# Patient Record
Sex: FEMALE | Race: WHITE | Employment: FULL TIME | URBAN - METROPOLITAN AREA
[De-identification: names, ages, dates, MRNs, and addresses within clinical notes are randomized per-mention and may not be internally consistent; named-entity substitution may affect disease eponyms.]

---

## 2017-01-16 ENCOUNTER — TELEPHONE (OUTPATIENT)
Dept: FAMILY MEDICINE | Facility: CLINIC | Age: 42
End: 2017-01-16

## 2017-01-16 DIAGNOSIS — E11.65 TYPE 2 DIABETES MELLITUS WITH HYPERGLYCEMIA, WITHOUT LONG-TERM CURRENT USE OF INSULIN (H): Primary | ICD-10-CM

## 2017-01-16 NOTE — TELEPHONE ENCOUNTER
Pharmacy called, requested One touch test strips be replaced with Verio test strips.   Request fulfilled.

## 2017-01-23 ENCOUNTER — MYC MEDICAL ADVICE (OUTPATIENT)
Dept: FAMILY MEDICINE | Facility: CLINIC | Age: 42
End: 2017-01-23

## 2017-01-24 NOTE — TELEPHONE ENCOUNTER
Could you have her make appointment, and bring in her blood sugar log?  This is too complex for a mychart message and needs an appointment.  We'll figure out the hair loss too.    Thanks,  Gabby Morgan MD

## 2017-01-24 NOTE — TELEPHONE ENCOUNTER
Call to patient without answer and left message to call our clinic and schedule an appointment to see Dr Morgan.Bring diabetic log in with her.

## 2017-01-24 NOTE — TELEPHONE ENCOUNTER
Call to patient who states that she did not noticed any change or have any symptoms when her Blood sugar was at 341. Patient stated that she was at the women's march in [place] so the adrenalin was going.Pt reports that her blood sugar has been running over 200 every morning since [place].She reports that when she goes to bed it is 100 and in the morning it is 240.Pt states that she has been under a lot of stress all election related.Pt has been testing 2-3 times a day and needs a new rx for test strips to reflect that. Will forward concerns to provider.

## 2017-02-03 ENCOUNTER — OFFICE VISIT (OUTPATIENT)
Dept: FAMILY MEDICINE | Facility: CLINIC | Age: 42
End: 2017-02-03
Payer: COMMERCIAL

## 2017-02-03 VITALS
OXYGEN SATURATION: 95 % | DIASTOLIC BLOOD PRESSURE: 64 MMHG | RESPIRATION RATE: 16 BRPM | TEMPERATURE: 97.6 F | HEART RATE: 78 BPM | WEIGHT: 179.6 LBS | SYSTOLIC BLOOD PRESSURE: 92 MMHG | HEIGHT: 68 IN | BODY MASS INDEX: 27.22 KG/M2

## 2017-02-03 DIAGNOSIS — L21.9 SEBORRHEIC DERMATITIS: ICD-10-CM

## 2017-02-03 DIAGNOSIS — E11.65 TYPE 2 DIABETES MELLITUS WITH HYPERGLYCEMIA, WITHOUT LONG-TERM CURRENT USE OF INSULIN (H): Primary | ICD-10-CM

## 2017-02-03 LAB — HBA1C MFR BLD: 8.2 % (ref 4.3–6)

## 2017-02-03 PROCEDURE — 83036 HEMOGLOBIN GLYCOSYLATED A1C: CPT | Performed by: FAMILY MEDICINE

## 2017-02-03 PROCEDURE — 84443 ASSAY THYROID STIM HORMONE: CPT | Performed by: FAMILY MEDICINE

## 2017-02-03 PROCEDURE — 80053 COMPREHEN METABOLIC PANEL: CPT | Performed by: FAMILY MEDICINE

## 2017-02-03 PROCEDURE — 99214 OFFICE O/P EST MOD 30 MIN: CPT | Performed by: FAMILY MEDICINE

## 2017-02-03 PROCEDURE — 36415 COLL VENOUS BLD VENIPUNCTURE: CPT | Performed by: FAMILY MEDICINE

## 2017-02-03 RX ORDER — KETOCONAZOLE 20 MG/ML
SHAMPOO TOPICAL
Qty: 120 ML | Refills: 1 | Status: SHIPPED | OUTPATIENT
Start: 2017-02-03

## 2017-02-03 NOTE — PROGRESS NOTES
"  SUBJECTIVE:                                                    Gabby Isaacs is a 42 year old female who presents to clinic today for the following health issues:      Diabetes Follow-up    Patient is checking blood sugars: once daily.  Results are as follows:         am - 300's before meds and eating, before bed and after meds in 100\"s    Diabetic concerns: None and blood sugar frequently over 200     Symptoms of hypoglycemia (low blood sugar): none     Paresthesias (numbness or burning in feet) or sores: No     Date of last diabetic eye exam: 12-5-16       Amount of exercise or physical activity: 2-3 days/week for an average of 45-60 minutes    Problems taking medications regularly: No    Medication side effects: none    Diet: carbohydrate counting      Randomly discovered nighttime with high sugar - in AM.  Always in 300s.  Otherwise when checks at night usually around 140.  STill eating well, really well, but feels like can't do much better.   Would like to test more, or maybe consider adding another med if needed.    Problem list and histories reviewed & adjusted, as indicated.  Additional history: as documented    Recent Labs   Lab Test  11/30/16   0842  08/31/16   0803  08/31/16   0745  05/16/16   1543   A1C  7.0*   --   9.8*   --    LDL  107*  104*   --    --    HDL  65  72   --    --    TRIG  63  59   --    --    CR   --    --   0.70   --    GFRESTIMATED   --    --   >90   --    GFRESTBLACK   --    --   >90   --    POTASSIUM   --    --   3.9   --    TSH   --    --   2.65  1.48        ROS:  Constitutional, HEENT, cardiovascular, pulmonary, gi and gu systems are negative, except as otherwise noted.    OBJECTIVE:                                                    BP 92/64 mmHg  Pulse 78  Temp(Src) 97.6  F (36.4  C) (Tympanic)  Resp 16  Ht 5' 8\" (1.727 m)  Wt 179 lb 9.6 oz (81.466 kg)  BMI 27.31 kg/m2  SpO2 95%  LMP 01/14/2017 (Approximate)  Body mass index is 27.31 kg/(m^2).  GENERAL: healthy, alert " and no distress  NECK: no adenopathy, no asymmetry, masses, or scars and thyroid normal to palpation  RESP: lungs clear to auscultation - no rales, rhonchi or wheezes  CV: regular rate and rhythm, normal S1 S2, no S3 or S4, no murmur, click or rub, no peripheral edema and peripheral pulses strong  ABDOMEN: soft, nontender, no hepatosplenomegaly, no masses and bowel sounds normal  MS: no gross musculoskeletal defects noted, no edema    Diagnostic Test Results:  No results found for this or any previous visit (from the past 24 hour(s)).  Unresulted Labs Ordered in the Past 30 Days of this Admission     Date and Time Order Name Status Description    2/3/2017 1450 COMPREHENSIVE METABOLIC PANEL In process     2/3/2017 1450 HEMOGLOBIN A1C In process     2/3/2017 1450 TSH WITH FREE T4 REFLEX In process              ASSESSMENT/PLAN:                                                          ICD-10-CM    1. Type 2 diabetes mellitus with hyperglycemia, without long-term current use of insulin (H) E11.65 TSH with free T4 reflex     Hemoglobin A1c     Comprehensive metabolic panel (BMP + Alb, Alk Phos, ALT, AST, Total. Bili, TP)     blood glucose monitoring (ONE TOUCH VERIO IQ) test strip   2. Seborrheic dermatitis L21.9 ketoconazole (NIZORAL) 2 % shampoo      For DM:    --Check A1C today.    --Check BS 4 times a day for next 1-2 weeks.    --Mychart results to me.    --Add protein rich bedtime snack.    --Will adjust as needed; may need additional medicine versus just better eating regularly.       For Seb Derm:    --ketoconazole shampoo.    Gabby Morgan MD  [facility]

## 2017-02-03 NOTE — NURSING NOTE
"Chief Complaint   Patient presents with     Diabetes       Initial BP 92/64 mmHg  Pulse 78  Temp(Src) 97.6  F (36.4  C) (Tympanic)  Resp 16  Ht 5' 8\" (1.727 m)  Wt 179 lb 9.6 oz (81.466 kg)  BMI 27.31 kg/m2  SpO2 95%  LMP 01/14/2017 (Approximate) Estimated body mass index is 27.31 kg/(m^2) as calculated from the following:    Height as of this encounter: 5' 8\" (1.727 m).    Weight as of this encounter: 179 lb 9.6 oz (81.466 kg).  BP completed using cuff size: large  .Alesia URBINA      "

## 2017-02-06 LAB
ALBUMIN SERPL-MCNC: 4 G/DL (ref 3.4–5)
ALP SERPL-CCNC: 71 U/L (ref 40–150)
ALT SERPL W P-5'-P-CCNC: 14 U/L (ref 0–50)
ANION GAP SERPL CALCULATED.3IONS-SCNC: 6 MMOL/L (ref 3–14)
AST SERPL W P-5'-P-CCNC: 8 U/L (ref 0–45)
BILIRUB SERPL-MCNC: 0.5 MG/DL (ref 0.2–1.3)
BUN SERPL-MCNC: 17 MG/DL (ref 7–30)
CALCIUM SERPL-MCNC: 9 MG/DL (ref 8.5–10.1)
CHLORIDE SERPL-SCNC: 102 MMOL/L (ref 94–109)
CO2 SERPL-SCNC: 26 MMOL/L (ref 20–32)
CREAT SERPL-MCNC: 0.5 MG/DL (ref 0.52–1.04)
GFR SERPL CREATININE-BSD FRML MDRD: ABNORMAL ML/MIN/1.7M2
GLUCOSE SERPL-MCNC: 315 MG/DL (ref 70–99)
POTASSIUM SERPL-SCNC: 4.4 MMOL/L (ref 3.4–5.3)
PROT SERPL-MCNC: 7.8 G/DL (ref 6.8–8.8)
SODIUM SERPL-SCNC: 134 MMOL/L (ref 133–144)
TSH SERPL DL<=0.005 MIU/L-ACNC: 1.22 MU/L (ref 0.4–4)

## 2017-02-09 ENCOUNTER — MYC MEDICAL ADVICE (OUTPATIENT)
Dept: FAMILY MEDICINE | Facility: CLINIC | Age: 42
End: 2017-02-09

## 2017-02-10 NOTE — TELEPHONE ENCOUNTER
Call to patient who states that she started on her new medication just yesterday and stayed home from work today as she is still not feeling well.Will keep provider updated.

## 2017-09-19 ENCOUNTER — TELEPHONE (OUTPATIENT)
Dept: FAMILY MEDICINE | Facility: CLINIC | Age: 42
End: 2017-09-19

## 2017-09-19 DIAGNOSIS — E10.9 TYPE 1 DIABETES MELLITUS WITHOUT COMPLICATION (H): ICD-10-CM

## 2017-09-19 NOTE — TELEPHONE ENCOUNTER
Called Gabby.  She went to [place] - turns out she has Type 1 Diabetes.  Getting pump.  Will come in for physical some time soon.  Updated chart.  Dr. Gabby Morgan MD/[facility]

## 2018-01-15 ENCOUNTER — TRANSFERRED RECORDS (OUTPATIENT)
Dept: HEALTH INFORMATION MANAGEMENT | Facility: CLINIC | Age: 43
End: 2018-01-15
Payer: COMMERCIAL

## 2018-02-06 ENCOUNTER — TRANSFERRED RECORDS (OUTPATIENT)
Dept: HEALTH INFORMATION MANAGEMENT | Facility: CLINIC | Age: 43
End: 2018-02-06
Payer: COMMERCIAL

## 2018-02-07 ENCOUNTER — APPOINTMENT (OUTPATIENT)
Dept: CT IMAGING | Facility: CLINIC | Age: 43
End: 2018-02-07
Attending: EMERGENCY MEDICINE
Payer: COMMERCIAL

## 2018-02-07 ENCOUNTER — HOSPITAL ENCOUNTER (EMERGENCY)
Facility: CLINIC | Age: 43
Discharge: HOME | End: 2018-02-08
Attending: EMERGENCY MEDICINE | Admitting: EMERGENCY MEDICINE
Payer: COMMERCIAL

## 2018-02-07 DIAGNOSIS — H53.8 BLURRED VISION: ICD-10-CM

## 2018-02-07 DIAGNOSIS — H04.123 DRY EYES: ICD-10-CM

## 2018-02-07 LAB
BASOPHILS # BLD AUTO: 0 10E9/L (ref 0–0.2)
BASOPHILS NFR BLD AUTO: 0.6 %
DIFFERENTIAL METHOD BLD: NORMAL
EOSINOPHIL # BLD AUTO: 0.3 10E9/L (ref 0–0.7)
EOSINOPHIL NFR BLD AUTO: 4.3 %
ERYTHROCYTE [DISTWIDTH] IN BLOOD BY AUTOMATED COUNT: 12.6 % (ref 10–15)
HCT VFR BLD AUTO: 37.8 % (ref 35–47)
HGB BLD-MCNC: 12.8 G/DL (ref 11.7–15.7)
IMM GRANULOCYTES # BLD: 0 10E9/L (ref 0–0.4)
IMM GRANULOCYTES NFR BLD: 0.2 %
LYMPHOCYTES # BLD AUTO: 2.5 10E9/L (ref 0.8–5.3)
LYMPHOCYTES NFR BLD AUTO: 39.4 %
MCH RBC QN AUTO: 28.9 PG (ref 26.5–33)
MCHC RBC AUTO-ENTMCNC: 33.9 G/DL (ref 31.5–36.5)
MCV RBC AUTO: 85 FL (ref 78–100)
MONOCYTES # BLD AUTO: 0.7 10E9/L (ref 0–1.3)
MONOCYTES NFR BLD AUTO: 10.7 %
NEUTROPHILS # BLD AUTO: 2.8 10E9/L (ref 1.6–8.3)
NEUTROPHILS NFR BLD AUTO: 44.8 %
NRBC # BLD AUTO: 0 10*3/UL
NRBC BLD AUTO-RTO: 0 /100
PLATELET # BLD AUTO: 345 10E9/L (ref 150–450)
RBC # BLD AUTO: 4.43 10E12/L (ref 3.8–5.2)
WBC # BLD AUTO: 6.2 10E9/L (ref 4–11)

## 2018-02-07 PROCEDURE — 96360 HYDRATION IV INFUSION INIT: CPT

## 2018-02-07 PROCEDURE — 85025 COMPLETE CBC W/AUTO DIFF WBC: CPT | Performed by: EMERGENCY MEDICINE

## 2018-02-07 PROCEDURE — 25000128 H RX IP 250 OP 636: Performed by: EMERGENCY MEDICINE

## 2018-02-07 PROCEDURE — 96361 HYDRATE IV INFUSION ADD-ON: CPT

## 2018-02-07 PROCEDURE — 70487 CT MAXILLOFACIAL W/DYE: CPT

## 2018-02-07 PROCEDURE — 99285 EMERGENCY DEPT VISIT HI MDM: CPT | Mod: 25

## 2018-02-07 PROCEDURE — 80048 BASIC METABOLIC PNL TOTAL CA: CPT | Performed by: EMERGENCY MEDICINE

## 2018-02-07 RX ORDER — IOPAMIDOL 755 MG/ML
80 INJECTION, SOLUTION INTRAVASCULAR ONCE
Status: COMPLETED | OUTPATIENT
Start: 2018-02-07 | End: 2018-02-08

## 2018-02-07 RX ADMIN — SODIUM CHLORIDE 1000 ML: 9 INJECTION, SOLUTION INTRAVENOUS at 23:50

## 2018-02-07 ASSESSMENT — VISUAL ACUITY
OS: 20/25
OD: 20/25

## 2018-02-07 ASSESSMENT — ENCOUNTER SYMPTOMS
EYE ITCHING: 0
PHOTOPHOBIA: 0
EYE PAIN: 0

## 2018-02-07 NOTE — ED AVS SNAPSHOT
Emergency Department    [address] [place] [address]    Phone:  [phone]    Fax:  [phone]                                       Gabby Isaacs   MRN: [id number]    Department:   Emergency Department   Date of Visit:  2/7/2018           After Visit Summary Signature Page     I have received my discharge instructions, and my questions have been answered. I have discussed any challenges I see with this plan with the nurse or doctor.    ..........................................................................................................................................  Patient/Patient Representative Signature      ..........................................................................................................................................  Patient Representative Print Name and Relationship to Patient    ..................................................               ................................................  Date                                            Time    ..........................................................................................................................................  Reviewed by Signature/Title    ...................................................              ..............................................  Date                                                            Time

## 2018-02-07 NOTE — ED AVS SNAPSHOT
Emergency Department    [address] [facility] [address]    Phone:  [phone]    Fax:  [phone]                                       Gabby Isaacs   MRN: [id number]    Department:   Emergency Department   Date of Visit:  2/7/2018           Patient Information     Date Of Birth          1975        Your diagnoses for this visit were:     Dry eyes     Blurred vision        You were seen by Norm Flores MD.      Follow-up Information     Follow up with Call your ENT doctor . Call in 1 day.    Why:  For repeat evaluation and symptom check        Follow up with  Emergency Department.    Specialty:  EMERGENCY MEDICINE    Why:  If symptoms worsen    Contact information:    [address] [facility] [address] [phone]        Discharge Instructions         What Are Dry Eyes?    Do your eyes ever sting, burn, or feel scratchy? To be comfortable, your eyes need to be bathed, or lubricated, with tears. Normally, there is always a film of tears on the surface of your eyes. But if your eyes don t produce enough tears, the surface gets irritated. This is known as dry eyes.  Not enough lubricating tears  When you cry, or get something in your eye, or have an infection, your eyes make reflex tears. Each time you blink, another kind of tears, called lubricating tears, spread over the surface of your eyes. These tears keep the eyes moist and comfortable. You aren t aware of these tears because they stay on the surface of your eyes.  Without lubricating tears, your eyes get dry. Then they burn or sting and feel scratchy. They may also water. But this doesn t relieve the dryness. That's because the eyes water with reflex tears, not lubricating tears.  What causes dry eyes?    Aging    Heaters and air conditioners    Wind, smoke, or dry weather    Allergies such as hay fever    Medicines    Eyelid problems, injuries to the eye, or diseases like rheumatoid  arthritis  How lubricating tears flow  Lubricating tears flow from glands in your upper eyelid over the surface of your eye. From your eye, the tears drain into puncta, which connect to drainage canals that lead to your nose.  Date Last Reviewed: 6/6/2015 2000-2017 [employer]. [address] [place] [address]. All rights reserved. This information is not intended as a substitute for professional medical care. Always follow your healthcare professional's instructions.          How the Eye Works    Sharp vision depends on many factors. The parts of the eye work together to refract, or bend, and focus light rays. For normal vision, light must focus onto the retina.   Cornea  Light enters the eye through this clear, dome-shaped tissue. The cornea also bends light rays to help focus them. Problems with the cornea's shape can affect vision.  Pupil  This circular window in the center of the iris opens and closes to let the right amount of light into the eye.  Iris  This is the colored part of the eye. It contains muscles that dilate or open, and constrict or close, the pupil.  Lens  This disk of clear tissue behind the pupil changes shape, or accommodates, to help focus light.  Retina  This thin layer of light-sensitive tissue lines the inside of the eye. The retina sends signals to the optic nerve.  Optic nerve  This nerve carries signals from the retina to the brain. The brain then interprets these signals to make images. These images are what you see.  Date Last Reviewed: 9/9/2015 2000-2017 [employer]. [address] [place] [address]. All rights reserved. This information is not intended as a substitute for professional medical care. Always follow your healthcare professional's instructions.          Discharge References/Attachments     VISION, BLURRED (ENGLISH)      24 Hour Appointment Hotline       To make an appointment at any [facility], call [phone]  ([phone]). If you don't have a family doctor or clinic, we will help you find one. [facility] are conveniently located to serve the needs of you and your family.             Review of your medicines      Our records show that you are taking the medicines listed below. If these are incorrect, please call your family doctor or clinic.        Dose / Directions Last dose taken    ACE NOT PRESCRIBED (INTENTIONAL)   Quantity:  0 each        ACE Inhibitor not prescribed due to Patient of childbearing potential   Refills:  0        ACE/ARB/ARNI NOT PRESCRIBED (INTENTIONAL)        Please choose reason not prescribed, below   Refills:  0        albuterol 108 (90 BASE) MCG/ACT Inhaler   Commonly known as:  PROAIR HFA/PROVENTIL HFA/VENTOLIN HFA   Dose:  1-2 puff   Quantity:  1 Inhaler        Inhale 1-2 puffs into the lungs every 4 hours as needed for shortness of breath / dyspnea or wheezing   Refills:  11        beclomethasone 40 MCG/ACT Inhaler   Commonly known as:  QVAR   Dose:  2 puff   Quantity:  3 Inhaler        Inhale 2 puffs into the lungs 2 times daily   Refills:  1        blood glucose monitoring lancets   Quantity:  1 Box        Use to test blood sugars one times daily. Please fill One Touch lancets to fit meter.   Refills:  1        blood glucose monitoring meter device kit   Commonly known as:  no brand specified   Quantity:  1 kit        Use to test blood sugar one times daily. Please dispense One Touch meter.   Refills:  0        blood glucose monitoring test strip   Commonly known as:  ONETOUCH VERIO IQ   Quantity:  100 strip        Use to test blood sugars four times daily. Please dispense Verio Test Strips.   Refills:  11        cholecalciferol 1000 UNIT tablet   Commonly known as:  vitamin D3   Dose:  2000 Units   Quantity:  180 tablet        Take 2 tablets (2,000 Units) by mouth daily   Refills:  3        EPIPEN 2-PAXTON 0.3 MG/0.3ML injection 2-pack   Generic drug:  EPINEPHrine        Refills:  0         ketoconazole 2 % shampoo   Commonly known as:  NIZORAL   Quantity:  120 mL        Apply to the affected area and wash off after 5 minutes.   Refills:  1        levothyroxine 175 MCG tablet   Commonly known as:  SYNTHROID/LEVOTHROID   Quantity:  90 tablet        TAKE 1 TABLET (175 MCG) BY MOUTH DAILY   Refills:  2        STATIN NOT PRESCRIBED (INTENTIONAL)   Quantity:  0 each        Statin not prescribed intentionally due to Woman of childbearing age not actively taking birth control   Refills:  0                Procedures and tests performed during your visit     Basic metabolic panel    CBC with platelets differential    CT Maxillofacial w Contrast      Orders Needing Specimen Collection     None      Pending Results     Date and Time Order Name Status Description    2/7/2018 2331 CT Maxillofacial w Contrast Preliminary             Pending Culture Results     No orders found for last 3 day(s).            Pending Results Instructions     If you had any lab results that were not finalized at the time of your Discharge, you can call the ED Lab Result RN at [phone]. You will be contacted by this team for any positive Lab results or changes in treatment. The nurses are available 7 days a week from 10A to 6:30P.  You can leave a message 24 hours per day and they will return your call.        Test Results From Your Hospital Stay        2/7/2018 11:46 PM      Component Results     Component Value Ref Range & Units Status    WBC 6.2 4.0 - 11.0 10e9/L Final    RBC Count 4.43 3.8 - 5.2 10e12/L Final    Hemoglobin 12.8 11.7 - 15.7 g/dL Final    Hematocrit 37.8 35.0 - 47.0 % Final    MCV 85 78 - 100 fl Final    MCH 28.9 26.5 - 33.0 pg Final    MCHC 33.9 31.5 - 36.5 g/dL Final    RDW 12.6 10.0 - 15.0 % Final    Platelet Count 345 150 - 450 10e9/L Final    Diff Method Automated Method  Final    % Neutrophils 44.8 % Final    % Lymphocytes 39.4 % Final    % Monocytes 10.7 % Final    % Eosinophils 4.3 % Final    %  Basophils 0.6 % Final    % Immature Granulocytes 0.2 % Final    Nucleated RBCs 0 0 /100 Final    Absolute Neutrophil 2.8 1.6 - 8.3 10e9/L Final    Absolute Lymphocytes 2.5 0.8 - 5.3 10e9/L Final    Absolute Monocytes 0.7 0.0 - 1.3 10e9/L Final    Absolute Eosinophils 0.3 0.0 - 0.7 10e9/L Final    Absolute Basophils 0.0 0.0 - 0.2 10e9/L Final    Abs Immature Granulocytes 0.0 0 - 0.4 10e9/L Final    Absolute Nucleated RBC 0.0  Final         2/8/2018 12:02 AM      Component Results     Component Value Ref Range & Units Status    Sodium 136 133 - 144 mmol/L Final    Potassium 3.8 3.4 - 5.3 mmol/L Final    Chloride 104 94 - 109 mmol/L Final    Carbon Dioxide 23 20 - 32 mmol/L Final    Anion Gap 9 3 - 14 mmol/L Final    Glucose 127 (H) 70 - 99 mg/dL Final    Urea Nitrogen 14 7 - 30 mg/dL Final    Creatinine 0.61 0.52 - 1.04 mg/dL Final    GFR Estimate >90 >60 mL/min/1.7m2 Final    Non  GFR Calc    GFR Estimate If Black >90 >60 mL/min/1.7m2 Final    African American GFR Calc    Calcium 8.2 (L) 8.5 - 10.1 mg/dL Final         2/8/2018 12:43 AM      Narrative     CT MAXILLOFACIAL W CONTRAST  2/8/2018 12:33 AM      HISTORY: Recent sinus surgery. New bilateral eye pain, right greater  than left.    TECHNIQUE: Multiplanar imaging of the face with intravenous contrast.  Radiation dose for this scan was reduced using automated exposure  control, adjustment of the mA and/or kV according to patient size, or  iterative reconstruction technique. 80 mL Isovue-370.     COMPARISON: None.    FINDINGS: There are postoperative changes in the medial walls of the  maxillary sinuses bilaterally. There is mucosal thickening in all of  the paranasal sinuses. Fluid levels in the left maxillary sinus and  sphenoid sinus. The orbits appear normal bilaterally. Lamina papyracea  appears intact bilaterally. No acute bone fractures.        Impression     IMPRESSION:  1. Normal appearance of the orbits.  2. Pansinusitis.                 Clinical Quality Measure: Blood Pressure Screening     Your blood pressure was checked while you were in the emergency department today. The last reading we obtained was  BP: 104/53 . Please read the guidelines below about what these numbers mean and what you should do about them.  If your systolic blood pressure (the top number) is less than 120 and your diastolic blood pressure (the bottom number) is less than 80, then your blood pressure is normal. There is nothing more that you need to do about it.  If your systolic blood pressure (the top number) is 120-139 or your diastolic blood pressure (the bottom number) is 80-89, your blood pressure may be higher than it should be. You should have your blood pressure rechecked within a year by a primary care provider.  If your systolic blood pressure (the top number) is 140 or greater or your diastolic blood pressure (the bottom number) is 90 or greater, you may have high blood pressure. High blood pressure is treatable, but if left untreated over time it can put you at risk for heart attack, stroke, or kidney failure. You should have your blood pressure rechecked by a primary care provider within the next 4 weeks.  If your provider in the emergency department today gave you specific instructions to follow-up with your doctor or provider even sooner than that, you should follow that instruction and not wait for up to 4 weeks for your follow-up visit.        Thank you for choosing [facility]       Thank you for choosing [facility] for your care. Our goal is always to provide you with excellent care. Hearing back from our patients is one way we can continue to improve our services. Please take a few minutes to complete the written survey that you may receive in the mail after you visit with us. Thank you!        [facility] Information     [facility] gives you secure access to your electronic health record. If you see a primary care provider, you can also send messages to your care  team and make appointments. If you have questions, please call your primary care clinic.  If you do not have a primary care provider, please call [phone] and they will assist you.        Care EveryWhere ID     This is your Care EveryWhere ID. This could be used by other organizations to access your [facility] medical records  [id number]        Equal Access to Services     SUMAN QUINTANA : Zahraa Ramirez, caren torre, rolanda madrigal. So [facility] [phone].    ATENCIÓN: Si habla español, tiene a ladd disposición servicios gratuitos de asistencia lingüística. Llame al [phone].    We comply with applicable federal civil rights laws and [place] laws. We do not discriminate on the basis of race, color, national origin, age, disability, sex, sexual orientation, or gender identity.            After Visit Summary       This is your record. Keep this with you and show to your community pharmacist(s) and doctor(s) at your next visit.

## 2018-02-08 VITALS
BODY MASS INDEX: 27.28 KG/M2 | SYSTOLIC BLOOD PRESSURE: 101 MMHG | DIASTOLIC BLOOD PRESSURE: 61 MMHG | RESPIRATION RATE: 20 BRPM | HEIGHT: 68 IN | WEIGHT: 180 LBS | TEMPERATURE: 98.6 F | HEART RATE: 77 BPM | OXYGEN SATURATION: 97 %

## 2018-02-08 LAB
ANION GAP SERPL CALCULATED.3IONS-SCNC: 9 MMOL/L (ref 3–14)
BUN SERPL-MCNC: 14 MG/DL (ref 7–30)
CALCIUM SERPL-MCNC: 8.2 MG/DL (ref 8.5–10.1)
CHLORIDE SERPL-SCNC: 104 MMOL/L (ref 94–109)
CO2 SERPL-SCNC: 23 MMOL/L (ref 20–32)
CREAT SERPL-MCNC: 0.61 MG/DL (ref 0.52–1.04)
GFR SERPL CREATININE-BSD FRML MDRD: >90 ML/MIN/1.7M2
GLUCOSE SERPL-MCNC: 127 MG/DL (ref 70–99)
POTASSIUM SERPL-SCNC: 3.8 MMOL/L (ref 3.4–5.3)
SODIUM SERPL-SCNC: 136 MMOL/L (ref 133–144)

## 2018-02-08 PROCEDURE — 25000125 ZZHC RX 250: Performed by: EMERGENCY MEDICINE

## 2018-02-08 PROCEDURE — 25000128 H RX IP 250 OP 636: Performed by: EMERGENCY MEDICINE

## 2018-02-08 RX ADMIN — SODIUM CHLORIDE 60 ML: 9 INJECTION, SOLUTION INTRAVENOUS at 00:28

## 2018-02-08 RX ADMIN — IOPAMIDOL 80 ML: 755 INJECTION, SOLUTION INTRAVENOUS at 00:27

## 2018-02-08 NOTE — DISCHARGE INSTRUCTIONS
What Are Dry Eyes?    Do your eyes ever sting, burn, or feel scratchy? To be comfortable, your eyes need to be bathed, or lubricated, with tears. Normally, there is always a film of tears on the surface of your eyes. But if your eyes don t produce enough tears, the surface gets irritated. This is known as dry eyes.  Not enough lubricating tears  When you cry, or get something in your eye, or have an infection, your eyes make reflex tears. Each time you blink, another kind of tears, called lubricating tears, spread over the surface of your eyes. These tears keep the eyes moist and comfortable. You aren t aware of these tears because they stay on the surface of your eyes.  Without lubricating tears, your eyes get dry. Then they burn or sting and feel scratchy. They may also water. But this doesn t relieve the dryness. That's because the eyes water with reflex tears, not lubricating tears.  What causes dry eyes?    Aging    Heaters and air conditioners    Wind, smoke, or dry weather    Allergies such as hay fever    Medicines    Eyelid problems, injuries to the eye, or diseases like rheumatoid arthritis  How lubricating tears flow  Lubricating tears flow from glands in your upper eyelid over the surface of your eye. From your eye, the tears drain into puncta, which connect to drainage canals that lead to your nose.  Date Last Reviewed: 6/6/2015 2000-2017 [employer]. [address] [place], [place] [id number]. All rights reserved. This information is not intended as a substitute for professional medical care. Always follow your healthcare professional's instructions.          How the Eye Works    Sharp vision depends on many factors. The parts of the eye work together to refract, or bend, and focus light rays. For normal vision, light must focus onto the retina.   Cornea  Light enters the eye through this clear, dome-shaped tissue. The cornea also bends light rays to help focus them. Problems with the  cornea's shape can affect vision.  Pupil  This circular window in the center of the iris opens and closes to let the right amount of light into the eye.  Iris  This is the colored part of the eye. It contains muscles that dilate or open, and constrict or close, the pupil.  Lens  This disk of clear tissue behind the pupil changes shape, or accommodates, to help focus light.  Retina  This thin layer of light-sensitive tissue lines the inside of the eye. The retina sends signals to the optic nerve.  Optic nerve  This nerve carries signals from the retina to the brain. The brain then interprets these signals to make images. These images are what you see.  Date Last Reviewed: 9/9/2015 2000-2017 [employer]. [address] [place] [address], [place] [address]. All rights reserved. This information is not intended as a substitute for professional medical care. Always follow your healthcare professional's instructions.

## 2018-02-08 NOTE — ED PROVIDER NOTES
History     Chief Complaint:  Eye Problem     HPI   Gabby Isaacs is a 43-year-old female with a history of diabetes and chronic dry eye, one day status post sinus surgery who presents to the emergency department today for evaluation of blurred vision. The patient reports that she called her ENT doctor who performed the surgery and was told she needed to be urgently evaluated by an ophthalmologist to evaluate for bleeding or swelling. She states that the blurred vision was first noticed at 0200 this morning when she woke up to take more pain medication. She remarks that it seems that her up close vision is impaired but she can see normally from a distance. She initially did not think much of this due to her history of chronic dry eye because she has similar symptoms, especially upon waking in the morning although these symptoms usually fade. The patient denies any loss of her visual field, seeing flashing lights or floaters, and denies any photophobia. She reports that she normally has 20/20 vision in both eyes.     Allergies:  No known drug allergies.     Medications:    Levothyroxine  Ketoconazole  Statin  Cholecalciferol  Beclomethasone  Albuterol inhaler   Epipen    Past Medical History:    History reviewed. No pertinent past medical history.    Past Surgical History:    Appendectomy  Deviated septum  Thyroidectomy    Family History:    Diabetes    Mother   Hypertension   Mother   Rhinitis   Mother   Arthritis   Mother   Depression   Mother   Hyperlipidemia   Mother   Osteoporosis   Mother   Mental Illness   Mother   Thyroid Disease  Mother   Mental Illness   Father   Cirrhosis   Father   Rhinitis   Father   Depression   Father   Rhinitis   Sister   Depression   Sister   Cirrhosis   Brother   Rhinitis   Brother   Mental Illness   Sister   Mental Illness   Brother    Social History:  The patient was accompanied to the ED by her mother.  Smoking Status: Former Smoker -- Cigarettes  Smokeless Tobacco: Never  "Used  Alcohol Use: Positive  Marital Status: Single      Review of Systems   Eyes: Positive for visual disturbance. Negative for photophobia, pain and itching.   All other systems reviewed and are negative.  Otherwise as stated in the HPI.     Physical Exam     Patient Vitals for the past 24 hrs:   BP Temp Temp src Pulse Resp SpO2 Height Weight   02/08/18 0145 101/61 - - - - - - -   02/08/18 0130 104/63 - - - - - - -   02/08/18 0115 104/53 - - - - 97 % - -   02/08/18 0100 107/61 - - - - 97 % - -   02/08/18 0051 - - - - - 98 % - -   02/08/18 0049 97/63 - - - - - - -   02/07/18 2202 116/64 98.6  F (37  C) Oral 77 20 100 % 1.727 m (5' 8\") 81.6 kg (180 lb)     Visual Acuity-Left: 20/25  Visual Acuity-Right: 20/25    Physical Exam  Vitals: reviewed by me  General: Pt seen on [facility], cooperative, and alert to conversation  Eyes: Tracking well, clear conjunctiva BL, extraocular movements are intact, no proptosis, pupils are round and reactive to both sides with no APD, visual fields are intact ×4, patient can read small font across the room with each eye.  ENT: MMM, midline trachea.  Oropharynx is unremarkable, no lesions noted.  No external evidence of trauma to nose.  Bilateral nares with no evidence of infection, no drainage noted.  No tenderness to palpation to maxillary or frontal sinuses.  Lungs:  No tachypnea, no accessory muscle use. No respiratory distress.   CV: Rate as above, regular rhythm.    Abd: Soft, non tender, no guarding, no rebound. Non distended  MSK: no peripheral edema or joint effusion.  No evidence of trauma  Skin: No rash, normal turgor and temperature  Neuro: Clear speech and no facial droop.  Psych: Not RIS, no e/o AH/VH    Emergency Department Course   Imaging:  CT Maxillofacial w Contrast  1. Normal appearance of the orbits.  2. Pansinusitis.    Radiographic findings were communicated with the patient who voiced understanding of the findings.    Laboratory:  Blood:  CBC:  " WBC 6.2, HGB 12.8, , otherwise WNL   BMP: Glc 127, Calcium 8.2, otherwise WNL (Creatinine 0.61)     Interventions:  (2350) Normal Saline, 1 liter, IV bolus      Emergency Department Course:  Nursing notes and vitals reviewed.    (4922) I entered the room with my scribe, obtained the history, and performed an exam of the patient as documented above.    A peripheral IV was established. Blood was drawn from the patient. This was sent for laboratory testing, findings above.      The patient was sent for a CT scan of the max-face while in the emergency department, findings above.      (0125) I went to update the patient on the findings. Answered questions prior to discharge.     Findings and plan explained to the patient. Patient discharged home with instructions regarding supportive care, medications, and reasons to return. The importance of close follow-up was reviewed.     Impression & Plan      Medical Decision Making:  Gabby Isaacs is a 43-year-old female who presents to the emergency room with bilateral eye dryness after an ENT procedure yesterday.  The patient's main concern, and the reason she was sent by the ENT physician, was to evaluate for any cause of trauma or infection.  The patient has a normal ENT exam apart from some mild swelling noted to her turbinates, and has no pain with extraocular movements making retro-orbital abscess very unlikely.  CT scan of the face shows no evidence of trauma or hematoma.  The patient has normal visual acuity here, no evidence of glaucoma, no evidence of visual field cuts. It is also slightly reassuring that the symptoms are bilateral and not unilateral.  The patient does state that this is similar to her chronic dry eyes, and her symptoms are better with artificial tears.  I do favor the diagnosis of slightly worse dry eyes after an ENT procedure as no other diagnosis has been found.  Very clear return to ED precautions were given, the patient states that she can  call her ENT doctor tomorrow.    Diagnosis:    ICD-10-CM   1. Dry eyes H04.123   2. Blurred vision H53.8     Disposition:   Discharge        [facility] EMERGENCY DEPARTMENT        Scribe disclosure:  I, Jose Lew, am serving as a scribe on 2/7/2018 at 11:22 PM to personally document services performed by Dr. Flores based on my observations and the provider's statements to me.                     Norm Flores MD  02/08/18 0452

## 2018-03-30 ENCOUNTER — TELEPHONE (OUTPATIENT)
Dept: FAMILY MEDICINE | Facility: CLINIC | Age: 43
End: 2018-03-30

## 2018-04-11 ENCOUNTER — HOSPITAL ENCOUNTER (OUTPATIENT)
Dept: MAMMOGRAPHY | Facility: CLINIC | Age: 43
Discharge: HOME | End: 2018-04-11
Admitting: RADIOLOGY
Payer: COMMERCIAL

## 2018-04-11 DIAGNOSIS — Z12.31 VISIT FOR SCREENING MAMMOGRAM: ICD-10-CM

## 2018-04-11 PROCEDURE — 77063 BREAST TOMOSYNTHESIS BI: CPT

## 2018-04-25 ENCOUNTER — TRANSFERRED RECORDS (OUTPATIENT)
Dept: HEALTH INFORMATION MANAGEMENT | Facility: CLINIC | Age: 43
End: 2018-04-25

## 2018-04-25 LAB
AST SERPL-CCNC: 13 U/L (ref 8–43)
CREAT SERPL-MCNC: 0.6 MG/DL (ref 0.6–1.1)
GFR SERPL CREATININE-BSD FRML MDRD: >60 ML/MIN/BSA
GLUCOSE SERPL-MCNC: 151 MG/DL (ref 70–100)
HBA1C MFR BLD: 7.1 % (ref 4–5.6)
POTASSIUM SERPL-SCNC: 4.3 MMOL/L (ref 3.6–5.2)
TSH SERPL-ACNC: 0.2 MIU/L (ref 0.3–4.2)

## 2018-06-01 ENCOUNTER — OFFICE VISIT (OUTPATIENT)
Dept: FAMILY MEDICINE | Facility: CLINIC | Age: 43
End: 2018-06-01
Payer: COMMERCIAL

## 2018-06-01 VITALS
BODY MASS INDEX: 26.61 KG/M2 | OXYGEN SATURATION: 99 % | HEART RATE: 78 BPM | WEIGHT: 175 LBS | TEMPERATURE: 98.4 F | SYSTOLIC BLOOD PRESSURE: 104 MMHG | DIASTOLIC BLOOD PRESSURE: 62 MMHG

## 2018-06-01 DIAGNOSIS — N94.6 DYSMENORRHEA: Primary | ICD-10-CM

## 2018-06-01 DIAGNOSIS — C73 PAPILLARY CARCINOMA OF THYROID (H): ICD-10-CM

## 2018-06-01 DIAGNOSIS — J45.30 MILD PERSISTENT ASTHMA WITHOUT COMPLICATION: ICD-10-CM

## 2018-06-01 DIAGNOSIS — E10.9 TYPE 1 DIABETES MELLITUS WITHOUT COMPLICATION (H): ICD-10-CM

## 2018-06-01 DIAGNOSIS — Z13.89 SCREENING FOR DIABETIC PERIPHERAL NEUROPATHY: ICD-10-CM

## 2018-06-01 PROCEDURE — 99214 OFFICE O/P EST MOD 30 MIN: CPT | Performed by: FAMILY MEDICINE

## 2018-06-01 RX ORDER — EPINEPHRINE 0.3 MG/.3ML
0.3 INJECTION SUBCUTANEOUS PRN
Qty: 0.6 ML | Refills: 1 | Status: SHIPPED | OUTPATIENT
Start: 2018-06-01

## 2018-06-01 RX ORDER — NORETHINDRONE ACETATE AND ETHINYL ESTRADIOL 1.5-30(21)
1 KIT ORAL DAILY
Qty: 90 TABLET | Refills: 3 | Status: SHIPPED | OUTPATIENT
Start: 2018-06-01 | End: 2019-04-04

## 2018-06-01 NOTE — PROGRESS NOTES
SUBJECTIVE:   Gabby Isaacs is a 43 year old female who presents to clinic today for the following health issues:      Medication Followup of junel    Taking Medication as prescribed: NO    Side Effects:  None    Medication Helping Symptoms:  NO-pt would like to restart     43 year old with type 1 diabetes and history of papillary carcinoma of thyroid both managed by [facility] here today for:    Heavy, heavy periods.  Can she go back on Junel?  Worked really well in past.  No family history or personal hx of blood clots and non-smoker.    Could she have refill of Epipen?  Multiple allergy.    Would like to discuss her diagnosis of type 2 DM; how it all happened as she got really sick before she was dx with type 1 at [place].    Asthma well controlled on qvar - doing well.  Could we fill?    ACT Total Scores 6/1/2018   ACT TOTAL SCORE (Goal Greater than or Equal to 20) 23   In the past 12 months, how many times did you visit the emergency room for your asthma without being admitted to the hospital? 1   In the past 12 months, how many times were you hospitalized overnight because of your asthma? 0         Problem list and histories reviewed & adjusted, as indicated.  Additional history: as documented    =    Reviewed and updated as needed this visit by clinical staff  Tobacco  Allergies  Meds  Problems  Med Hx  Surg Hx  Fam Hx  Soc Hx        Reviewed and updated as needed this visit by Provider  Allergies  Meds  Problems         ROS:  Constitutional, HEENT, cardiovascular, pulmonary, gi and gu systems are negative, except as otherwise noted.    OBJECTIVE:     /62 (Cuff Size: Adult Large)  Pulse 78  Temp 98.4  F (36.9  C) (Tympanic)  Wt 175 lb (79.4 kg)  SpO2 99%  BMI 26.61 kg/m2  Body mass index is 26.61 kg/(m^2).  GENERAL: healthy, alert and no distress  PSYCH: mentation appears normal, affect normal/bright      ASSESSMENT/PLAN:       Gabby was seen today for recheck medication.    Diagnoses and  all orders for this visit:    Dysmenorrhea  Comments:  >10 years on OCP for cramps; in same sex relationships so no need for birth control; consider stopping OCP x 3 months and see how periods are; restart if needed  Orders:  -     norethindrone-ethinyl estradiol-iron (JUNEL FE 1.5/30) 1.5-30 MG-MCG per tablet; Take 1 tablet by mouth daily    Type 1 diabetes mellitus without complication (H)    Mild persistent asthma without complication  -     EPINEPHrine (EPIPEN 2-PAXTON) 0.3 MG/0.3ML injection 2-pack; Inject 0.3 mLs (0.3 mg) into the muscle as needed for anaphylaxis  -     beclomethasone (QVAR) 40 MCG/ACT Inhaler; Inhale 2 puffs into the lungs 2 times daily  -     glucagon (GLUCAGON EMERGENCY) 1 MG kit; Inject 1 mg into the muscle once for 1 dose    Papillary carcinoma of thyroid (H)    Screening for diabetic peripheral neuropathy    Other orders  -     Cancel: HIV Screening  -     Cancel: FOOT EXAM  NO CHARGE [04257.114]  -     Cancel: HEMOGLOBIN A1C  -     Cancel: Lipid panel reflex to direct LDL Fasting  -     Cancel: Albumin Random Urine Quantitative with Creat Ratio  -     Cancel: TSH WITH FREE T4 REFLEX      For DM:  --Discussed dx and why type 1 wasn't tested for initially (fam history of type 2, overweight, responded to metformin and diet changes).  --Apologized to patient!  Glad she is doing so well now.  --Managed by [facility].  Sent results to abstracting; well controlled.    For Dysmenorrhea:  --Junel has worked well in past; patient would like to restart.  --No personal or fam history of liver disease or blood clots.  --Discussed risks of this with oral contraceptive pills and other options for dysmenorrhea (IUD, ablation).  --Patient would prefer oral contraceptive pills; refilled.      For asthma/allergies:  --Well controlled, refilled qvar and epipen.    Discussed with patient, all questions answered, in agreement with this plan, will return or seek further care if not improving or worsening.    Gabby Rain  MD Eddie  [facility]

## 2018-06-01 NOTE — Clinical Note
Can you abstract diabetes labs from [facility]?  In Care Everywhere and chart. Thanks, Dr. Gabby Morgan MD/[facility]

## 2018-06-01 NOTE — LETTER
My Asthma Action Plan  Name: Gabby Isaacs   YOB: 1975  Date: 6/1/2018   My doctor: Gabby Morgan MD   My clinic: [facility]        My Control Medicine: { :852368}  My Rescue Medicine: { :221565}  {AAP include Oral Steroid:075787} My Asthma Severity: { :453287}  Avoid your asthma triggers: { :470854}        {Is patient a child or adult?:847099}       GREEN ZONE   Good Control    I feel good    No cough or wheeze    Can work, sleep and play without asthma symptoms       Take your asthma control medicine every day.     1. If exercise triggers your asthma, take your rescue medication    15 minutes before exercise or sports, and    During exercise if you have asthma symptoms  2. Spacer to use with inhaler: If you have a spacer, make sure to use it with your inhaler             YELLOW ZONE Getting Worse  I have ANY of these:    I do not feel good    Cough or wheeze    Chest feels tight    Wake up at night   1. Keep taking your Green Zone medications  2. Start taking your rescue medicine:    every 20 minutes for up to 1 hour. Then every 4 hours for 24-48 hours.  3. If you stay in the Yellow Zone for more than 12-24 hours, contact your doctor.  4. If you do not return to the Green Zone in 12-24 hours or you get worse, start taking your oral steroid medicine if prescribed by your provider.           RED ZONE Medical Alert - Get Help  I have ANY of these:    I feel awful    Medicine is not helping    Breathing getting harder    Trouble walking or talking    Nose opens wide to breathe       1. Take your rescue medicine NOW  2. If your provider has prescribed an oral steroid medicine, start taking it NOW  3. Call your doctor NOW  4. If you are still in the Red Zone after 20 minutes and you have not reached your doctor:    Take your rescue medicine again and    Call 911 or go to the emergency room right away    See your regular doctor within 2 weeks of an Emergency Room or  Urgent Care visit for follow-up treatment.          Annual Reminders:  Meet with Asthma Educator,  Flu Shot in the Fall, consider Pneumonia Vaccination for patients with asthma (aged 19 and older).    Pharmacy: [facility] [address] [place] [address] [place]                      Asthma Triggers  How To Control Things That Make Your Asthma Worse    Triggers are things that make your asthma worse.  Look at the list below to help you find your triggers and what you can do about them.  You can help prevent asthma flare-ups by staying away from your triggers.      Trigger                                                          What you can do   Cigarette Smoke  Tobacco smoke can make asthma worse. Do not allow smoking in your home, car or around you.  Be sure no one smokes at a child s day care or school.  If you smoke, ask your health care provider for ways to help you quit.  Ask family members to quit too.  Ask your health care provider for a referral to Quit Plan to help you quit smoking, or call 5-228-640-PLAN.     Colds, Flu, Bronchitis  These are common triggers of asthma. Wash your hands often.  Don t touch your eyes, nose or mouth.  Get a flu shot every year.     Dust Mites  These are tiny bugs that live in cloth or carpet. They are too small to see. Wash sheets and blankets in hot water every week.   Encase pillows and mattress in dust mite proof covers.  Avoid having carpet if you can. If you have carpet, vacuum weekly.   Use a dust mask and HEPA vacuum.   Pollen and Outdoor Mold  Some people are allergic to trees, grass, or weed pollen, or molds. Try to keep your windows closed.  Limit time out doors when pollen count is high.   Ask you health care provider about taking medicine during allergy season.     Animal Dander  Some people are allergic to skin flakes, urine or saliva from pets with fur or feathers. Keep pets with fur or feathers out of your home.    If you can t keep the pet outdoors, then  keep the pet out of your bedroom.  Keep the bedroom door closed.  Keep pets off cloth furniture and away from stuffed toys.     Mice, Rats, and Cockroaches  Some people are allergic to the waste from these pests.   Cover food and garbage.  Clean up spills and food crumbs.  Store grease in the refrigerator.   Keep food out of the bedroom.   Indoor Mold  This can be a trigger if your home has high moisture. Fix leaking faucets, pipes, or other sources of water.   Clean moldy surfaces.  Dehumidify basement if it is damp and smelly.   Smoke, Strong Odors, and Sprays  These can reduce air quality. Stay away from strong odors and sprays, such as perfume, powder, hair spray, paints, smoke incense, paint, cleaning products, candles and new carpet.   Exercise or Sports  Some people with asthma have this trigger. Be active!  Ask your doctor about taking medicine before sports or exercise to prevent symptoms.    Warm up for 5-10 minutes before and after sports or exercise.     Other Triggers of Asthma  Cold air:  Cover your nose and mouth with a scarf.  Sometimes laughing or crying can be a trigger.  Some medicines and food can trigger asthma.

## 2018-06-01 NOTE — MR AVS SNAPSHOT
After Visit Summary   6/1/2018    Gabby Isaacs    MRN: [id number]           Patient Information     Date Of Birth          1975        Visit Information        Provider Department      6/1/2018 4:20 PM Gabby Morgan MD [facility]        Today's Diagnoses     Type 1 diabetes mellitus without complication (H)    -  1    Mild persistent asthma without complication        Screening for diabetic peripheral neuropathy        Dysmenorrhea           Follow-ups after your visit        Who to contact     If you have questions or need follow up information about today's clinic visit or your schedule please contact [facility] directly at [phone].  Normal or non-critical lab and imaging results will be communicated to you by [employer], letter or phone within 4 business days after the clinic has received the results. If you do not hear from us within 7 days, please contact the clinic through [employer] or phone. If you have a critical or abnormal lab result, we will notify you by phone as soon as possible.  Submit refill requests through [employer] or call your pharmacy and they will forward the refill request to us. Please allow 3 business days for your refill to be completed.          Additional Information About Your Visit        MyChart Information     [employer] gives you secure access to your electronic health record. If you see a primary care provider, you can also send messages to your care team and make appointments. If you have questions, please call your primary care clinic.  If you do not have a primary care provider, please call [phone] and they will assist you.        Care EveryWhere ID     This is your Care EveryWhere ID. This could be used by other organizations to access your [facility] medical records  [id number]        Your Vitals Were     Pulse Temperature Pulse Oximetry BMI (Body Mass Index)          78 98.4  F (36.9   C) (Tympanic) 99% 26.61 kg/m2         Blood Pressure from Last 3 Encounters:   06/01/18 104/62   02/08/18 101/61   02/03/17 92/64    Weight from Last 3 Encounters:   06/01/18 175 lb (79.4 kg)   02/07/18 180 lb (81.6 kg)   02/03/17 179 lb 9.6 oz (81.5 kg)              We Performed the Following     Asthma Action Plan (AAP)          Today's Medication Changes          These changes are accurate as of 6/1/18  5:43 PM.  If you have any questions, ask your nurse or doctor.               Start taking these medicines.        Dose/Directions    glucagon 1 MG kit   Commonly known as:  GLUCAGON EMERGENCY   Used for:  Mild persistent asthma without complication   Started by:  Gabby Morgan MD        Dose:  1 mg   Inject 1 mg into the muscle once for 1 dose   Quantity:  1 mg   Refills:  0       norethindrone-ethinyl estradiol-iron 1.5-30 MG-MCG per tablet   Commonly known as:  JUNEL FE 1.5/30   Used for:  Dysmenorrhea   Started by:  Gabby Morgan MD        Dose:  1 tablet   Take 1 tablet by mouth daily   Quantity:  90 tablet   Refills:  3         These medicines have changed or have updated prescriptions.        Dose/Directions    EPINEPHrine 0.3 MG/0.3ML injection 2-pack   Commonly known as:  EPIPEN 2-PAXTON   This may have changed:    - how much to take  - how to take this  - when to take this  - reasons to take this   Used for:  Mild persistent asthma without complication   Changed by:  Gabby Morgan MD        Dose:  0.3 mg   Inject 0.3 mLs (0.3 mg) into the muscle as needed for anaphylaxis   Quantity:  0.6 mL   Refills:  1            Where to get your medicines      These medications were sent to [place] [id number] [place] [facility] [address]  [address] [facility] [address]     Phone:  [phone]     beclomethasone 40 MCG/ACT Inhaler    EPINEPHrine 0.3 MG/0.3ML injection 2-pack    glucagon 1 MG kit    norethindrone-ethinyl estradiol-iron 1.5-30 MG-MCG per tablet                Primary  Care Provider Office Phone # Fax #    [facility] [phone]       [address] [place] [address]        Equal Access to Services     SUMAN QUINTANA : Zahraa Ramirez, caren torre, viralavell hooksarielapage maysnasim, rolanda kavonin hayaasaurabh astudillomatthias carminesaidaje gan. So [facility] [phone].    ATENCIÓN: Si habla español, tiene a ladd disposición servicios gratuitos de asistencia lingüística. Llame al [phone].    We comply with applicable federal civil rights laws and [place] laws. We do not discriminate on the basis of race, color, national origin, age, disability, sex, sexual orientation, or gender identity.            Thank you!     Thank you for choosing [facility]  for your care. Our goal is always to provide you with excellent care. Hearing back from our patients is one way we can continue to improve our services. Please take a few minutes to complete the written survey that you may receive in the mail after your visit with us. Thank you!             Your Updated Medication List - Protect others around you: Learn how to safely use, store and throw away your medicines at www.disposemymeds.org.          This list is accurate as of 6/1/18  5:43 PM.  Always use your most recent med list.                   Brand Name Dispense Instructions for use Diagnosis    ACE NOT PRESCRIBED (INTENTIONAL)     0 each    ACE Inhibitor not prescribed due to Patient of childbearing potential    Type 2 diabetes mellitus with hyperglycemia, without long-term current use of insulin (H)       ACE/ARB/ARNI NOT PRESCRIBED (INTENTIONAL)      Please choose reason not prescribed, below    Type 2 diabetes mellitus with hyperglycemia, without long-term current use of insulin (H)       albuterol 108 (90 Base) MCG/ACT Inhaler    PROAIR HFA/PROVENTIL HFA/VENTOLIN HFA    1 Inhaler    Inhale 1-2 puffs into the lungs every 4 hours as needed for shortness of breath / dyspnea or  wheezing    Seasonal allergies       beclomethasone 40 MCG/ACT Inhaler    QVAR    3 Inhaler    Inhale 2 puffs into the lungs 2 times daily    Mild persistent asthma without complication       blood glucose monitoring lancets     1 Box    Use to test blood sugars one times daily. Please fill One Touch lancets to fit meter.    Type 2 diabetes mellitus with hyperglycemia, without long-term current use of insulin (H)       blood glucose monitoring meter device kit    no brand specified    1 kit    Use to test blood sugar one times daily. Please dispense One Touch meter.    Type 2 diabetes mellitus with hyperglycemia, without long-term current use of insulin (H)       blood glucose monitoring test strip    ONETOUCH VERIO IQ    100 strip    Use to test blood sugars four times daily. Please dispense Verio Test Strips.    Type 2 diabetes mellitus with hyperglycemia, without long-term current use of insulin (H)       cholecalciferol 1000 UNIT tablet    vitamin D3    180 tablet    Take 2 tablets (2,000 Units) by mouth daily    Vitamin D deficiency       EPINEPHrine 0.3 MG/0.3ML injection 2-pack    EPIPEN 2-PAXTON    0.6 mL    Inject 0.3 mLs (0.3 mg) into the muscle as needed for anaphylaxis    Mild persistent asthma without complication       glucagon 1 MG kit    GLUCAGON EMERGENCY    1 mg    Inject 1 mg into the muscle once for 1 dose    Mild persistent asthma without complication       ketoconazole 2 % shampoo    NIZORAL    120 mL    Apply to the affected area and wash off after 5 minutes.    Seborrheic dermatitis       levothyroxine 175 MCG tablet    SYNTHROID/LEVOTHROID    90 tablet    TAKE 1 TABLET (175 MCG) BY MOUTH DAILY    Postoperative hypothyroidism       norethindrone-ethinyl estradiol-iron 1.5-30 MG-MCG per tablet    JUNEL FE 1.5/30    90 tablet    Take 1 tablet by mouth daily    Dysmenorrhea       NovoLOG VIAL 100 UNITS/ML injection   Generic drug:  insulin aspart      INJECT UP TO 80 UNITS DAILY VIA PUMP.         STATIN NOT PRESCRIBED (INTENTIONAL)     0 each    Statin not prescribed intentionally due to Woman of childbearing age not actively taking birth control    Type 2 diabetes mellitus with hyperglycemia, without long-term current use of insulin (H)

## 2018-06-02 ASSESSMENT — ASTHMA QUESTIONNAIRES: ACT_TOTALSCORE: 23

## 2018-06-04 ENCOUNTER — HOSPITAL ENCOUNTER (EMERGENCY)
Facility: CLINIC | Age: 43
Discharge: HOME | End: 2018-06-05
Attending: EMERGENCY MEDICINE | Admitting: EMERGENCY MEDICINE
Payer: COMMERCIAL

## 2018-06-04 DIAGNOSIS — R79.89 ELEVATED BLOOD KETONE BODY LEVEL: ICD-10-CM

## 2018-06-04 DIAGNOSIS — R53.83 FATIGUE, UNSPECIFIED TYPE: ICD-10-CM

## 2018-06-04 DIAGNOSIS — R73.9 HYPERGLYCEMIA: ICD-10-CM

## 2018-06-04 PROCEDURE — 25000128 H RX IP 250 OP 636: Performed by: EMERGENCY MEDICINE

## 2018-06-04 PROCEDURE — 80048 BASIC METABOLIC PNL TOTAL CA: CPT | Performed by: EMERGENCY MEDICINE

## 2018-06-04 PROCEDURE — 82010 KETONE BODYS QUAN: CPT | Performed by: EMERGENCY MEDICINE

## 2018-06-04 PROCEDURE — 96361 HYDRATE IV INFUSION ADD-ON: CPT

## 2018-06-04 PROCEDURE — 96374 THER/PROPH/DIAG INJ IV PUSH: CPT

## 2018-06-04 PROCEDURE — 85025 COMPLETE CBC W/AUTO DIFF WBC: CPT | Performed by: EMERGENCY MEDICINE

## 2018-06-04 PROCEDURE — 99284 EMERGENCY DEPT VISIT MOD MDM: CPT | Mod: 25

## 2018-06-04 RX ORDER — ONDANSETRON 2 MG/ML
4 INJECTION INTRAMUSCULAR; INTRAVENOUS
Status: COMPLETED | OUTPATIENT
Start: 2018-06-04 | End: 2018-06-04

## 2018-06-04 RX ADMIN — ONDANSETRON 4 MG: 2 INJECTION INTRAMUSCULAR; INTRAVENOUS at 23:55

## 2018-06-04 NOTE — ED AVS SNAPSHOT
Emergency Department    [address] [place] [id number]    Phone:  [phone]    Fax:  [phone]                                       Gabby Isaacs   MRN: [id number]    Department:   Emergency Department   Date of Visit:  6/4/2018           Patient Information     Date Of Birth          1975        Your diagnoses for this visit were:     Hyperglycemia     Fatigue, unspecified type     Elevated blood ketone body level        You were seen by Yonatan Cherry MD.      Follow-up Information     Please follow up.    Why:  follow up with your endocrinology clinic - return here if any concerns in the meantime        Discharge Instructions         High Blood Sugar (Hyperglycemia)     When you have hyperglycemia, drink plenty of water or other sugar-free, caffeine-free liquids.   Too much glucose (sugar) in your blood is called hyperglycemia or high blood sugar. High blood sugar can lead to a dangerous condition called ketoacidosis. In severe cases, it can lead to dehydration and coma.  Possible causes of hyperglycemia    Inadequate treatment plan for diabetes     Being sick    Being under stress    Taking certain medicines, such as steroids    Eating too much food, especially carbohydrates    Being less active than usual    Not taking enough diabetes medicine  Symptoms of hyperglycemia  Hyperglycemia may not cause symptoms. If you do have symptoms, they may include:    Thirst    Frequent need to urinate    Feeling tired    Nausea and vomiting    Itchy, dry skin    Blurry vision    Fast breathing and breath that smells fruity     Weakness    Dizziness    Wounds or skin infections that don t heal    Unexplained weight loss if hyperglycemia lasts for more than a few days   What you should do  Make sure you do the following:    Check your blood sugar.    Drink plenty of sugar-free, caffeine-free liquids such as water. Don t drink fruit juice.    Check your blood sugar again every 4 hours. If  you take insulin or diabetes medicines, follow your sick-day plan for taking medicine. Call your healthcare provider if you are not able to eat.    Check your blood or urine for ketones as directed.    Call your healthcare provider if your blood sugar and ketones do not return to your target range.  Preventing high blood sugar  To help keep your blood sugar from getting too high:    Control stress.    When you're ill, follow your sick-day plan.     Follow your meal plan. Eat only the amount of food on your meal plan    Follow your exercise plan.    Take your insulin or diabetes medicines as directed by your healthcare team. Also test your blood sugar as directed. If the plan is not working for you, discuss it with your healthcare provider.  Other things to do    Carry a medical ID card, a compact USB drive, or wear a medical alert bracelet or necklace. It should say that you have diabetes. It should also say what to do in case you pass out or go into a coma.    Make sure family, friends, and coworkers know the signs of high blood sugar. Tell them what to do if your blood sugar gets very high and you can t help yourself.    Talk to your healthcare team about other things you can do to prevent high blood sugar.   Special note: Drink plenty of sugar-free and caffeine-free liquids when you feel symptoms of hyperglycemia. Call your healthcare provider if you keep having episodes of hyperglycemia.   Date Last Reviewed: 5/1/2016 2000-2017 [employer]. [address] [place] [address]. All rights reserved. This information is not intended as a substitute for professional medical care. Always follow your healthcare professional's instructions.          24 Hour Appointment Hotline       To make an appointment at any [facility], call [phone] ([phone]). If you don't have a family doctor or clinic, we will help you find one. [facility] are conveniently located to serve the needs  of you and your family.             Review of your medicines      Our records show that you are taking the medicines listed below. If these are incorrect, please call your family doctor or clinic.        Dose / Directions Last dose taken    ACE NOT PRESCRIBED (INTENTIONAL)   Quantity:  0 each        ACE Inhibitor not prescribed due to Patient of childbearing potential   Refills:  0        ACE/ARB/ARNI NOT PRESCRIBED (INTENTIONAL)        Please choose reason not prescribed, below   Refills:  0        albuterol 108 (90 Base) MCG/ACT Inhaler   Commonly known as:  PROAIR HFA/PROVENTIL HFA/VENTOLIN HFA   Dose:  1-2 puff   Quantity:  1 Inhaler        Inhale 1-2 puffs into the lungs every 4 hours as needed for shortness of breath / dyspnea or wheezing   Refills:  11        beclomethasone 40 MCG/ACT Inhaler   Commonly known as:  QVAR   Dose:  2 puff   Quantity:  3 Inhaler        Inhale 2 puffs into the lungs 2 times daily   Refills:  1        blood glucose monitoring lancets   Quantity:  1 Box        Use to test blood sugars one times daily. Please fill One Touch lancets to fit meter.   Refills:  1        blood glucose monitoring meter device kit   Commonly known as:  no brand specified   Quantity:  1 kit        Use to test blood sugar one times daily. Please dispense One Touch meter.   Refills:  0        blood glucose monitoring test strip   Commonly known as:  ONETOUCH VERIO IQ   Quantity:  100 strip        Use to test blood sugars four times daily. Please dispense Verio Test Strips.   Refills:  11        cholecalciferol 1000 UNIT tablet   Commonly known as:  vitamin D3   Dose:  2000 Units   Quantity:  180 tablet        Take 2 tablets (2,000 Units) by mouth daily   Refills:  3        EPINEPHrine 0.3 MG/0.3ML injection 2-pack   Commonly known as:  EPIPEN 2-PAXTON   Dose:  0.3 mg   Quantity:  0.6 mL        Inject 0.3 mLs (0.3 mg) into the muscle as needed for anaphylaxis   Refills:  1        glucagon 1 MG kit   Commonly known  as:  GLUCAGON EMERGENCY   Dose:  1 mg   Quantity:  1 mg        Inject 1 mg into the muscle once for 1 dose   Refills:  0        ketoconazole 2 % shampoo   Commonly known as:  NIZORAL   Quantity:  120 mL        Apply to the affected area and wash off after 5 minutes.   Refills:  1        levothyroxine 175 MCG tablet   Commonly known as:  SYNTHROID/LEVOTHROID   Quantity:  90 tablet        TAKE 1 TABLET (175 MCG) BY MOUTH DAILY   Refills:  2        norethindrone-ethinyl estradiol-iron 1.5-30 MG-MCG per tablet   Commonly known as:  JUNEL FE 1.5/30   Dose:  1 tablet   Quantity:  90 tablet        Take 1 tablet by mouth daily   Refills:  3        NovoLOG VIAL 100 UNITS/ML injection   Generic drug:  insulin aspart        INJECT UP TO 80 UNITS DAILY VIA PUMP.   Refills:  3        STATIN NOT PRESCRIBED (INTENTIONAL)   Quantity:  0 each        Statin not prescribed intentionally due to Woman of childbearing age not actively taking birth control   Refills:  0                Procedures and tests performed during your visit     Basic metabolic panel    CBC with platelets + differential    Ketone Beta-Hydroxybutyrate Quantitative    Lactic acid    Peripheral IV catheter      Orders Needing Specimen Collection     None      Pending Results     No orders found for last 3 day(s).            Pending Culture Results     No orders found for last 3 day(s).            Pending Results Instructions     If you had any lab results that were not finalized at the time of your Discharge, you can call the ED Lab Result RN at [phone]. You will be contacted by this team for any positive Lab results or changes in treatment. The nurses are available 7 days a week from 10A to 6:30P.  You can leave a message 24 hours per day and they will return your call.        Test Results From Your Hospital Stay        6/5/2018 12:09 AM      Component Results     Component Value Ref Range & Units Status    Ketone Quantitative 3.1 (HH) 0.0 - 0.6 mmol/L Final     Critical Value called to and read back by  DR MERA IN ER 0008 CK           6/5/2018 12:21 AM      Component Results     Component Value Ref Range & Units Status    WBC 9.3 4.0 - 11.0 10e9/L Final    RBC Count 4.43 3.8 - 5.2 10e12/L Final    Hemoglobin 12.3 11.7 - 15.7 g/dL Final    Hematocrit 36.7 35.0 - 47.0 % Final    MCV 83 78 - 100 fl Final    MCH 27.8 26.5 - 33.0 pg Final    MCHC 33.5 31.5 - 36.5 g/dL Final    RDW 13.4 10.0 - 15.0 % Final    Platelet Count 374 150 - 450 10e9/L Final    Diff Method Automated Method  Final    % Neutrophils 59.3 % Final    % Lymphocytes 32.3 % Final    % Monocytes 6.2 % Final    % Eosinophils 1.6 % Final    % Basophils 0.5 % Final    % Immature Granulocytes 0.1 % Final    Nucleated RBCs 0 0 /100 Final    Absolute Neutrophil 5.5 1.6 - 8.3 10e9/L Final    Absolute Lymphocytes 3.0 0.8 - 5.3 10e9/L Final    Absolute Monocytes 0.6 0.0 - 1.3 10e9/L Final    Absolute Eosinophils 0.2 0.0 - 0.7 10e9/L Final    Absolute Basophils 0.1 0.0 - 0.2 10e9/L Final    Abs Immature Granulocytes 0.0 0 - 0.4 10e9/L Final    Absolute Nucleated RBC 0.0  Final         6/5/2018 12:23 AM      Component Results     Component Value Ref Range & Units Status    Sodium 134 133 - 144 mmol/L Final    Potassium 3.8 3.4 - 5.3 mmol/L Final    Chloride 102 94 - 109 mmol/L Final    Carbon Dioxide 18 (L) 20 - 32 mmol/L Final    Anion Gap 14 3 - 14 mmol/L Final    Glucose 272 (H) 70 - 99 mg/dL Final    Urea Nitrogen 16 7 - 30 mg/dL Final    Creatinine 0.60 0.52 - 1.04 mg/dL Final    GFR Estimate >90 >60 mL/min/1.7m2 Final    Non  GFR Calc    GFR Estimate If Black >90 >60 mL/min/1.7m2 Final    African American GFR Calc    Calcium 8.8 8.5 - 10.1 mg/dL Final         6/5/2018  1:09 AM      Component Results     Component Value Ref Range & Units Status    Lactic Acid 0.7 0.7 - 2.0 mmol/L Final                Clinical Quality Measure: Blood Pressure Screening     Your blood pressure was checked while you were in  the emergency department today. The last reading we obtained was  BP: 128/72 . Please read the guidelines below about what these numbers mean and what you should do about them.  If your systolic blood pressure (the top number) is less than 120 and your diastolic blood pressure (the bottom number) is less than 80, then your blood pressure is normal. There is nothing more that you need to do about it.  If your systolic blood pressure (the top number) is 120-139 or your diastolic blood pressure (the bottom number) is 80-89, your blood pressure may be higher than it should be. You should have your blood pressure rechecked within a year by a primary care provider.  If your systolic blood pressure (the top number) is 140 or greater or your diastolic blood pressure (the bottom number) is 90 or greater, you may have high blood pressure. High blood pressure is treatable, but if left untreated over time it can put you at risk for heart attack, stroke, or kidney failure. You should have your blood pressure rechecked by a primary care provider within the next 4 weeks.  If your provider in the emergency department today gave you specific instructions to follow-up with your doctor or provider even sooner than that, you should follow that instruction and not wait for up to 4 weeks for your follow-up visit.        Thank you for choosing [facility]       Thank you for choosing [facility] for your care. Our goal is always to provide you with excellent care. Hearing back from our patients is one way we can continue to improve our services. Please take a few minutes to complete the written survey that you may receive in the mail after you visit with us. Thank you!        [employer] Information     [employer] gives you secure access to your electronic health record. If you see a primary care provider, you can also send messages to your care team and make appointments. If you have questions, please call your primary care clinic.  If you do not  have a primary care provider, please call [phone] and they will assist you.        Care EveryWhere ID     This is your Care EveryWhere ID. This could be used by other organizations to access your [facility] medical records  [id number]        Equal Access to Services     SUMAN QUINTANA : Zahraa Ramirez, caren torre, rolanda madrigal. So [facility] [phone].    ATENCIÓN: Si habla español, tiene a ladd disposición servicios gratuitos de asistencia lingüística. Llame al [phone].    We comply with applicable federal civil rights laws and [place] laws. We do not discriminate on the basis of race, color, national origin, age, disability, sex, sexual orientation, or gender identity.            After Visit Summary       This is your record. Keep this with you and show to your community pharmacist(s) and doctor(s) at your next visit.

## 2018-06-04 NOTE — ED AVS SNAPSHOT
Emergency Department    [address] [place] [address]    Phone:  [phone]    Fax:  [phone]                                       Gabby Isaacs   MRN: [id number]    Department:   Emergency Department   Date of Visit:  6/4/2018           After Visit Summary Signature Page     I have received my discharge instructions, and my questions have been answered. I have discussed any challenges I see with this plan with the nurse or doctor.    ..........................................................................................................................................  Patient/Patient Representative Signature      ..........................................................................................................................................  Patient Representative Print Name and Relationship to Patient    ..................................................               ................................................  Date                                            Time    ..........................................................................................................................................  Reviewed by Signature/Title    ...................................................              ..............................................  Date                                                            Time

## 2018-06-05 ENCOUNTER — TELEPHONE (OUTPATIENT)
Dept: FAMILY MEDICINE | Facility: CLINIC | Age: 43
End: 2018-06-05

## 2018-06-05 VITALS
HEIGHT: 68 IN | OXYGEN SATURATION: 99 % | BODY MASS INDEX: 26.52 KG/M2 | TEMPERATURE: 98.2 F | RESPIRATION RATE: 18 BRPM | DIASTOLIC BLOOD PRESSURE: 72 MMHG | HEART RATE: 92 BPM | SYSTOLIC BLOOD PRESSURE: 128 MMHG | WEIGHT: 175 LBS

## 2018-06-05 DIAGNOSIS — J45.40 MODERATE PERSISTENT EXTRINSIC ASTHMA WITHOUT COMPLICATION: Primary | ICD-10-CM

## 2018-06-05 LAB
ANION GAP SERPL CALCULATED.3IONS-SCNC: 14 MMOL/L (ref 3–14)
BASOPHILS # BLD AUTO: 0.1 10E9/L (ref 0–0.2)
BASOPHILS NFR BLD AUTO: 0.5 %
BUN SERPL-MCNC: 16 MG/DL (ref 7–30)
CALCIUM SERPL-MCNC: 8.8 MG/DL (ref 8.5–10.1)
CHLORIDE SERPL-SCNC: 102 MMOL/L (ref 94–109)
CO2 SERPL-SCNC: 18 MMOL/L (ref 20–32)
CREAT SERPL-MCNC: 0.6 MG/DL (ref 0.52–1.04)
DIFFERENTIAL METHOD BLD: NORMAL
EOSINOPHIL # BLD AUTO: 0.2 10E9/L (ref 0–0.7)
EOSINOPHIL NFR BLD AUTO: 1.6 %
ERYTHROCYTE [DISTWIDTH] IN BLOOD BY AUTOMATED COUNT: 13.4 % (ref 10–15)
GFR SERPL CREATININE-BSD FRML MDRD: >90 ML/MIN/1.7M2
GLUCOSE SERPL-MCNC: 272 MG/DL (ref 70–99)
HCT VFR BLD AUTO: 36.7 % (ref 35–47)
HGB BLD-MCNC: 12.3 G/DL (ref 11.7–15.7)
IMM GRANULOCYTES # BLD: 0 10E9/L (ref 0–0.4)
IMM GRANULOCYTES NFR BLD: 0.1 %
KETONES BLD-SCNC: 3.1 MMOL/L (ref 0–0.6)
LACTATE BLD-SCNC: 0.7 MMOL/L (ref 0.7–2)
LYMPHOCYTES # BLD AUTO: 3 10E9/L (ref 0.8–5.3)
LYMPHOCYTES NFR BLD AUTO: 32.3 %
MCH RBC QN AUTO: 27.8 PG (ref 26.5–33)
MCHC RBC AUTO-ENTMCNC: 33.5 G/DL (ref 31.5–36.5)
MCV RBC AUTO: 83 FL (ref 78–100)
MONOCYTES # BLD AUTO: 0.6 10E9/L (ref 0–1.3)
MONOCYTES NFR BLD AUTO: 6.2 %
NEUTROPHILS # BLD AUTO: 5.5 10E9/L (ref 1.6–8.3)
NEUTROPHILS NFR BLD AUTO: 59.3 %
NRBC # BLD AUTO: 0 10*3/UL
NRBC BLD AUTO-RTO: 0 /100
PLATELET # BLD AUTO: 374 10E9/L (ref 150–450)
POTASSIUM SERPL-SCNC: 3.8 MMOL/L (ref 3.4–5.3)
RBC # BLD AUTO: 4.43 10E12/L (ref 3.8–5.2)
SODIUM SERPL-SCNC: 134 MMOL/L (ref 133–144)
WBC # BLD AUTO: 9.3 10E9/L (ref 4–11)

## 2018-06-05 PROCEDURE — 83605 ASSAY OF LACTIC ACID: CPT | Performed by: EMERGENCY MEDICINE

## 2018-06-05 PROCEDURE — 25000128 H RX IP 250 OP 636: Performed by: EMERGENCY MEDICINE

## 2018-06-05 RX ADMIN — SODIUM CHLORIDE 2000 ML: 9 INJECTION, SOLUTION INTRAVENOUS at 00:42

## 2018-06-05 ASSESSMENT — ENCOUNTER SYMPTOMS
APPETITE CHANGE: 1
DIARRHEA: 0
POLYDIPSIA: 1
FATIGUE: 1
VOMITING: 0
NAUSEA: 1

## 2018-06-05 NOTE — ED PROVIDER NOTES
History     Chief Complaint:  Hyperglycemia    HPI   Gabby Isaacs is a 43 year old female who presents to the emergency department today for evaluation of hyperglycemia.  The patient reports a history of type 1 diabetes which was diagnosed nearly two years ago, and notes that she has been struggling with hyperglycemia intermittently during the evenings for the past several weeks, with blood sugars ranging from 250-300.  Tonight, she began feeling unwell near dinnertime, so she measured her ketones using urine strips and found herself to have large ketones, prompting her presentation here.  She also reports a blood sugar of 230 and has been feeling nauseated, thirsty, and fatigued with a minimal appetite, though she does not feel her symptoms are as severe as her history of DKA.  She also reports minimal diarrhea over the past several days, but none today, and has been having normal periods.  She denies any chance of pregnancy as she is not sexually active with men.    Allergies:  Dust Mites  Food  Melon  Mold  Pollen Extract    Medications:    Albuterol  Qvar inhaler  Vitamin D  Epipen  Glucagon  Ketoconazole  Levothyroxine  Norethindrone-ethinyl estradiol-iron  Novolog    Past Medical History:    Type 1 diabetes  DKA    Past Surgical History:    Appendectomy  Septoplasty  Thyroidectomy    Family History:    Diabetes-mother  Hypertension-mother  Rhinitis-mother, father, sister  Arthritis-mother  Depression-mother, father, sister  Hyperlipidemia-mother  Osteoporosis-mother  Mental illness-mother, father, brother  Thyroid disease-mother  Cirrhosis-brother    Social History:  The patient presented to the ED alone.  Smoking Status: Former smoker  Smokeless Tobacco: Never used  Alcohol Use: 3 drinks/month  Marital Status:  Single [1]    Review of Systems   Constitutional: Positive for appetite change and fatigue.   Gastrointestinal: Positive for nausea. Negative for diarrhea and vomiting.   Endocrine: Positive for  "polydipsia.   All other systems reviewed and are negative.    Physical Exam     Patient Vitals for the past 24 hrs:   BP Temp Temp src Pulse Resp SpO2 Height Weight   06/05/18 0045 128/72 - - 92 18 99 % - -   06/04/18 2307 130/75 98.2  F (36.8  C) Oral 95 18 99 % 1.727 m (5' 8\") 79.4 kg (175 lb)     Physical Exam  SKIN:  Warm, dry.  HEMATOLOGIC/IMMUNOLOGIC/LYMPHATIC:  No pallor.  HENT:  Moist oral mucosa.  EYES:  Conjunctivae normal.  CARDIOVASCULAR:  Regular rate and rhythm.  RESPIRATORY:  No respiratory distress, breath sounds equal and normal.  GASTROINTESTINAL:  Soft, nontender abdomen.  MUSCULOSKELETAL:  Normal body habitus.  NEUROLOGIC:  Alert, conversant.  PSYCHIATRIC:  Anxious mood.    Emergency Department Course     Laboratory:  Laboratory findings were communicated with the patient who voiced understanding of the findings.    CBC: AWNL. (WBC 9.3, HGB 12.3, )   BMP: Glucose 272 (H), CO2 18 (L) o/w WNL (Creatinine 0.60)  Lactic Acid (Collected at 0036): 0.7  Ketone Beta-Hydroxybutyrate Quantitative: 3.1 (HH)    Interventions:  2355 Zofran 4 mg IV  0042 ns 1 L IV    Emergency Department Course:  Nursing notes and vitals reviewed.  2318: I performed an exam of the patient as documented above.   IV was inserted and blood was drawn for laboratory testing, results above.  0101: I rechecked the patient and updated her on the results of laboratory studies.  0135: I rechecked the patient and discussed the treatment plan with her.  She expressed understanding of this plan and consented to discharge.  She will be discharged home with instructions for care and follow up.  In addition, the patient will return to the emergency department if her symptoms worsen, if new symptoms arise or if there is any concern.  All questions were answered prior to discharge.    Impression & Plan       Medical Decision Making:  This patient presents with concern about malaise/fatigue with ongoing hyperglycemia which has been an " issue for about 2-4 weeks.  Diabetic ketoacidosis was a consideration although her lactic acid was negative and her bicarb was only 18, and she is not terribly hyperglycemic.  She has been running at this range in the high 200s during the course of her symptoms in the last couple weeks or so.  She was hydrated with a liter of saline and she did feel well enough to be discharged for further management as an outpatient.  Advised endocrinology follow-up, but to return here if feeling worse or any new concerns in the coming days.    Diagnosis:    ICD-10-CM    1. Hyperglycemia R73.9    2. Fatigue, unspecified type R53.83    3. Elevated blood ketone body level R79.89      Disposition:   Home    Scribe Disclosure:  I, Digna Virgen, am serving as a scribe at 11:18 PM on 6/4/2018 to document services personally performed by Yonatan Cherry MD, based on my observations and the provider's statements to me.    6/4/2018    EMERGENCY DEPARTMENT       Yonatan Cherry MD  06/05/18 0683

## 2018-06-05 NOTE — ED NOTES
"Patient uses call light; patient reports that her insulin pump is alarming that she is needing to switch her tubing and insulin vial, patient does not have any extra supplies with her to do this.  Patient appears very anxious and states \"right now I'm not getting any insulin and that's a problem, maybe I should just use my insulin pen\"  Patient advised at this time to not administer any insulin via insulin pen until ED provider is consulted.  Dr. Cherry is at bedside speaking to patient regarding discharge plans and following up with endocrinologist tomorrow.  "

## 2018-06-05 NOTE — TELEPHONE ENCOUNTER
"?? Leann, I don't know what this means, can't find anything called \"redihaler\" or \"redinhaler\".    Team - can you help clarify and I'll send whatever is covered?  Thanks,  Dr. Gabby Morgan MD/[facility]    "

## 2018-06-05 NOTE — DISCHARGE INSTRUCTIONS
High Blood Sugar (Hyperglycemia)     When you have hyperglycemia, drink plenty of water or other sugar-free, caffeine-free liquids.   Too much glucose (sugar) in your blood is called hyperglycemia or high blood sugar. High blood sugar can lead to a dangerous condition called ketoacidosis. In severe cases, it can lead to dehydration and coma.  Possible causes of hyperglycemia    Inadequate treatment plan for diabetes     Being sick    Being under stress    Taking certain medicines, such as steroids    Eating too much food, especially carbohydrates    Being less active than usual    Not taking enough diabetes medicine  Symptoms of hyperglycemia  Hyperglycemia may not cause symptoms. If you do have symptoms, they may include:    Thirst    Frequent need to urinate    Feeling tired    Nausea and vomiting    Itchy, dry skin    Blurry vision    Fast breathing and breath that smells fruity     Weakness    Dizziness    Wounds or skin infections that don t heal    Unexplained weight loss if hyperglycemia lasts for more than a few days   What you should do  Make sure you do the following:    Check your blood sugar.    Drink plenty of sugar-free, caffeine-free liquids such as water. Don t drink fruit juice.    Check your blood sugar again every 4 hours. If you take insulin or diabetes medicines, follow your sick-day plan for taking medicine. Call your healthcare provider if you are not able to eat.    Check your blood or urine for ketones as directed.    Call your healthcare provider if your blood sugar and ketones do not return to your target range.  Preventing high blood sugar  To help keep your blood sugar from getting too high:    Control stress.    When you're ill, follow your sick-day plan.     Follow your meal plan. Eat only the amount of food on your meal plan    Follow your exercise plan.    Take your insulin or diabetes medicines as directed by your healthcare team. Also test your blood sugar as directed. If the  plan is not working for you, discuss it with your healthcare provider.  Other things to do    Carry a medical ID card, a compact USB drive, or wear a medical alert bracelet or necklace. It should say that you have diabetes. It should also say what to do in case you pass out or go into a coma.    Make sure family, friends, and coworkers know the signs of high blood sugar. Tell them what to do if your blood sugar gets very high and you can t help yourself.    Talk to your healthcare team about other things you can do to prevent high blood sugar.   Special note: Drink plenty of sugar-free and caffeine-free liquids when you feel symptoms of hyperglycemia. Call your healthcare provider if you keep having episodes of hyperglycemia.   Date Last Reviewed: 5/1/2016 2000-2017 [employer]. [address] [place] [address], [place] [address]. All rights reserved. This information is not intended as a substitute for professional medical care. Always follow your healthcare professional's instructions.

## 2018-06-05 NOTE — ED NOTES
NS liter 1 of 2 completed at this time - patient requesting to be discharge, Dr. Cherry aware and agrees with this plan.

## 2018-06-07 ENCOUNTER — TELEPHONE (OUTPATIENT)
Dept: FAMILY MEDICINE | Facility: CLINIC | Age: 43
End: 2018-06-07

## 2018-06-07 DIAGNOSIS — J45.40 MODERATE PERSISTENT EXTRINSIC ASTHMA WITHOUT COMPLICATION: Primary | ICD-10-CM

## 2018-06-07 NOTE — TELEPHONE ENCOUNTER
Prior Authorization Retail Medication Request    Medication/Dose: Beclomethasone Diprop HFA 40 MCG/ACT AERB  ICD code (if different than what is on RX):     Previously Tried and Failed:     Rationale:       Insurance Name:     Insurance ID:         Pharmacy Information (if different than what is on RX)  [facility]    Phone:  [phone]  [employer]  TRX code [id number]

## 2018-06-07 NOTE — TELEPHONE ENCOUNTER
QVAR has been discontinued by . QVAR Redihaler is the only option. If provider wishes to switch to QVAR Redihaler, the pharmacy will need a new prescription. Once pharmacy runs the medication, a determination will be made on whether the medication needs a PA or not.

## 2018-06-08 NOTE — TELEPHONE ENCOUNTER
Done.  Please let me know if further problems.  Dr. Gabby Morgan MD/[facility]

## 2018-06-21 ENCOUNTER — TELEPHONE (OUTPATIENT)
Dept: FAMILY MEDICINE | Facility: CLINIC | Age: 43
End: 2018-06-21

## 2018-06-21 NOTE — TELEPHONE ENCOUNTER
PA Initiation    Medication: Beclomethasone Diprop HFA (QVAR REDIHALER) 40 MCG/ACT AERB  Insurance Company: [employer] - Phone [phone] Fax [phone]  Pharm[facility] Filling [facility] [id number] [place] [facility] - [address]  Filling Pharmacy Phone: [phone]  Filling Pharmacy Fax:    Start Date: 6/21/2018    Central Prior Authorization Team   Phone: [phone]        Awaiting additional questions via CMM

## 2018-06-21 NOTE — TELEPHONE ENCOUNTER
Prior Authorization Retail Medication Request    Medication/Dose: Beclomethasone Diprop HFA (QVAR REDIHALER) 40 MCG/ACT AERB  ICD code (if different than what is on RX):     Previously Tried and Failed:     Rationale:       Insurance Name:     Insurance ID:         Pharmacy Information (if different than what is on RX)  [facility]   Phone:  [phone]    [facility]  Enter TRX code  [id number]

## 2018-06-27 NOTE — TELEPHONE ENCOUNTER
Prior Authorization Not Needed per Insurance    Medication: Beclomethasone Diprop HFA (QVAR REDIHALER) 40 MCG/ACT AERB  Insurance Company: [employer] - Phone [phone] Fax [phone]  [facility] [id number] [place] [address]  Pharmacy Notified: Yes- talked to tech at [facility] he will call their help desk number and if cant get it resolved give me a call back. Got a voicemail back stating that he called and its not covered and that alternatives were flovent, pulmacort, asmanex. WIll call [employer]  Patient Notified: No

## 2018-06-27 NOTE — TELEPHONE ENCOUNTER
Called [facility] at [phone] to verify if a PA is needed or not,. Per representative she was able to start a PA over the phone, case ID# [id number] and we should receive a fax with the determination.

## 2018-06-28 NOTE — TELEPHONE ENCOUNTER
Prior Authorization Approval    Authorization Effective Date: 6/27/2018  Authorization Expiration Date: 6/27/2019  Medication: Beclomethasone Diprop HFA (QVAR REDIHALER) 40 MCG/ACT AERB  Approved Dose/Quantity: 1/30 days  Reference #: [id number]   Insurance Company: [employer] - Phone [phone] Fax [phone]  Which Pharmacy is filling the prescription (Not needed for infusion/clinic administered): [facility] [id number] [place] [address]  Pharmacy Notified: Yes- pharmacy will call patient once ready  Patient Notified: Yes

## 2018-09-16 ENCOUNTER — TRANSFERRED RECORDS (OUTPATIENT)
Dept: HEALTH INFORMATION MANAGEMENT | Facility: CLINIC | Age: 43
End: 2018-09-16

## 2018-11-01 ENCOUNTER — TRANSFERRED RECORDS (OUTPATIENT)
Dept: HEALTH INFORMATION MANAGEMENT | Facility: CLINIC | Age: 43
End: 2018-11-01

## 2018-11-01 LAB
ALT SERPL-CCNC: 15 IU/L (ref 8–45)
AST SERPL-CCNC: 15 IU/L (ref 2–40)
CHOLEST SERPL-MCNC: 209 MG/DL (ref 100–199)
CREAT SERPL-MCNC: 0.72 MG/DL (ref 0.57–1.11)
GFR SERPL CREATININE-BSD FRML MDRD: >60 ML/MIN/1.73M2
GLUCOSE SERPL-MCNC: 201 MG/DL (ref 65–100)
HDLC SERPL-MCNC: 70 MG/DL
LDLC SERPL CALC-MCNC: 125 MG/DL
NONHDLC SERPL-MCNC: 139 MG/DL
POTASSIUM SERPL-SCNC: 4.6 MMOL/L (ref 3.5–5)
TRIGL SERPL-MCNC: 72 MG/DL

## 2019-01-18 LAB — PAP-ABSTRACT: NORMAL

## 2019-02-18 ENCOUNTER — TRANSFERRED RECORDS (OUTPATIENT)
Dept: HEALTH INFORMATION MANAGEMENT | Facility: CLINIC | Age: 44
End: 2019-02-18

## 2019-02-18 LAB
GLUCOSE SERPL-MCNC: 175 MG/DL (ref 65–140)
HBA1C MFR BLD: 6.9 % (ref 0–5.7)
TSH SERPL-ACNC: 4.15 UIU/ML (ref 0.35–4.94)

## 2019-03-01 ENCOUNTER — TRANSFERRED RECORDS (OUTPATIENT)
Dept: HEALTH INFORMATION MANAGEMENT | Facility: CLINIC | Age: 44
End: 2019-03-01

## 2019-04-02 DIAGNOSIS — N94.6 DYSMENORRHEA: ICD-10-CM

## 2019-04-02 NOTE — TELEPHONE ENCOUNTER
"Requested Prescriptions   Pending Prescriptions Disp Refills     norethindrone-ethinyl estradiol-iron (JUNEL FE 1.5/30) 1.5-30 MG-MCG tablet  Last Written Prescription Date:  6/1/2018  Last Fill Quantity: 90 tablet,  # refills: 3   Last office visit: 6/1/2018 with prescribing provider:  Eddie   Future Office Visit:     90 tablet 3     Sig: Take 1 tablet by mouth daily    Contraceptives Protocol Passed - 4/2/2019  1:34 PM       Passed - Patient is not a current smoker if age is 35 or older       Passed - Recent (12 mo) or future (30 days) visit within the authorizing provider's specialty    Patient had office visit in the last 12 months or has a visit in the next 30 days with authorizing provider or within the authorizing provider's specialty.  See \"Patient Info\" tab in inbasket, or \"Choose Columns\" in Meds & Orders section of the refill encounter.             Passed - Medication is active on med list       Passed - No active pregnancy on record       Passed - No positive pregnancy test in past 12 months           "

## 2019-04-04 RX ORDER — NORETHINDRONE ACETATE AND ETHINYL ESTRADIOL 1.5-30(21)
1 KIT ORAL DAILY
Qty: 90 TABLET | Refills: 0 | Status: SHIPPED | OUTPATIENT
Start: 2019-04-04

## 2019-04-12 ENCOUNTER — TELEPHONE (OUTPATIENT)
Dept: FAMILY MEDICINE | Facility: CLINIC | Age: 44
End: 2019-04-12

## 2019-04-12 NOTE — TELEPHONE ENCOUNTER
Hi Abstracting:  Could you please abstract Diabetes Care Everywhere Labs (A1C, etc) from 2/2019 from [facility]?  And Microalbumin from 11/1/2018?    Also, can you abstract Eye Exam?  It's in Care Everywhere on 3/1/2019.  Or do I have to print it? If so, let me know please.    Thanks,  Dr. Gabby Morgan MD/[facility]

## 2019-04-12 NOTE — TELEPHONE ENCOUNTER
Everywhere Result Report  MICROALBUMIN RANDOM URINEResulted: 11/1/2018 4:46 PM  [facility] & [facility]  Component Name Value Ref Range   ALB RAND URINE            <5.0 mg/L   CREATININE,URINE          1.15 g/L   MICROALBUMIN,RAND UR          Comment: Urine Albumin below measurement range, unable to calculate <30.0 mg/g creat   PROTEIN, URINE Negative Negative mg/dL   Specimen Collected on   Urine - Urine 11/1/2018 9:41 AM   Result Narrative   If Microalbumin is elevated, consider the following:        Elevations seen with incipient nephropathy associated     with diabetes mellitus or hypertension. Stress, exercise,     hematuria, and urinary tract infection may also produce      elevated results. If clinically indicated, confirm with      24 Hour Microalbumin.

## 2019-05-08 NOTE — TELEPHONE ENCOUNTER
5/8/19 - I printed eye exam and microalbumin and sent to scanning.  Dr. Gabby Morgan MD/[facility]

## 2019-05-17 ENCOUNTER — TELEPHONE (OUTPATIENT)
Dept: FAMILY MEDICINE | Facility: CLINIC | Age: 44
End: 2019-05-17

## 2019-06-05 ENCOUNTER — HOSPITAL ENCOUNTER (EMERGENCY)
Facility: CLINIC | Age: 44
Discharge: HOME | End: 2019-06-05
Attending: EMERGENCY MEDICINE | Admitting: EMERGENCY MEDICINE
Payer: COMMERCIAL

## 2019-06-05 VITALS
SYSTOLIC BLOOD PRESSURE: 110 MMHG | RESPIRATION RATE: 20 BRPM | TEMPERATURE: 98.5 F | BODY MASS INDEX: 26.61 KG/M2 | HEIGHT: 68 IN | DIASTOLIC BLOOD PRESSURE: 75 MMHG | OXYGEN SATURATION: 97 %

## 2019-06-05 DIAGNOSIS — R73.9 HYPERGLYCEMIA: ICD-10-CM

## 2019-06-05 LAB
ALBUMIN UR-MCNC: NEGATIVE MG/DL
ANION GAP SERPL CALCULATED.3IONS-SCNC: 8 MMOL/L (ref 3–14)
APPEARANCE UR: CLEAR
BACTERIA #/AREA URNS HPF: ABNORMAL /HPF
BASE EXCESS BLDV CALC-SCNC: 1 MMOL/L
BASOPHILS # BLD AUTO: 0.1 10E9/L (ref 0–0.2)
BASOPHILS NFR BLD AUTO: 0.7 %
BILIRUB UR QL STRIP: NEGATIVE
BUN SERPL-MCNC: 20 MG/DL (ref 7–30)
CALCIUM SERPL-MCNC: 9 MG/DL (ref 8.5–10.1)
CHLORIDE SERPL-SCNC: 104 MMOL/L (ref 94–109)
CO2 BLDCOV-SCNC: 24 MMOL/L (ref 21–28)
CO2 SERPL-SCNC: 24 MMOL/L (ref 20–32)
COLOR UR AUTO: ABNORMAL
CREAT SERPL-MCNC: 0.66 MG/DL (ref 0.52–1.04)
DIFFERENTIAL METHOD BLD: NORMAL
EOSINOPHIL # BLD AUTO: 0.4 10E9/L (ref 0–0.7)
EOSINOPHIL NFR BLD AUTO: 5.1 %
ERYTHROCYTE [DISTWIDTH] IN BLOOD BY AUTOMATED COUNT: 12.8 % (ref 10–15)
GFR SERPL CREATININE-BSD FRML MDRD: >90 ML/MIN/{1.73_M2}
GLUCOSE SERPL-MCNC: 277 MG/DL (ref 70–99)
GLUCOSE UR STRIP-MCNC: >1000 MG/DL
HCG UR QL: NEGATIVE
HCO3 BLDV-SCNC: 26 MMOL/L (ref 21–28)
HCT VFR BLD AUTO: 40.7 % (ref 35–47)
HGB BLD-MCNC: 14.1 G/DL (ref 11.7–15.7)
HGB UR QL STRIP: NEGATIVE
IMM GRANULOCYTES # BLD: 0 10E9/L (ref 0–0.4)
IMM GRANULOCYTES NFR BLD: 0.3 %
KETONES BLD-SCNC: 0.2 MMOL/L (ref 0–0.6)
KETONES UR STRIP-MCNC: 80 MG/DL
LACTATE BLD-SCNC: 1.2 MMOL/L (ref 0.7–2.1)
LEUKOCYTE ESTERASE UR QL STRIP: NEGATIVE
LYMPHOCYTES # BLD AUTO: 3.1 10E9/L (ref 0.8–5.3)
LYMPHOCYTES NFR BLD AUTO: 42.8 %
MCH RBC QN AUTO: 29.9 PG (ref 26.5–33)
MCHC RBC AUTO-ENTMCNC: 34.6 G/DL (ref 31.5–36.5)
MCV RBC AUTO: 86 FL (ref 78–100)
MONOCYTES # BLD AUTO: 0.5 10E9/L (ref 0–1.3)
MONOCYTES NFR BLD AUTO: 7.1 %
NEUTROPHILS # BLD AUTO: 3.2 10E9/L (ref 1.6–8.3)
NEUTROPHILS NFR BLD AUTO: 44 %
NITRATE UR QL: NEGATIVE
NRBC # BLD AUTO: 0 10*3/UL
NRBC BLD AUTO-RTO: 0 /100
OXYHGB MFR BLDV: 47 %
PCO2 BLDV: 41 MM HG (ref 40–50)
PCO2 BLDV: 44 MM HG (ref 40–50)
PH BLDV: 7.38 PH (ref 7.32–7.43)
PH BLDV: 7.39 PH (ref 7.32–7.43)
PH UR STRIP: 5.5 PH (ref 5–7)
PLATELET # BLD AUTO: 367 10E9/L (ref 150–450)
PO2 BLDV: 22 MM HG (ref 25–47)
PO2 BLDV: 27 MM HG (ref 25–47)
POTASSIUM SERPL-SCNC: 3.9 MMOL/L (ref 3.4–5.3)
RBC # BLD AUTO: 4.71 10E12/L (ref 3.8–5.2)
RBC #/AREA URNS AUTO: 1 /HPF (ref 0–2)
SAO2 % BLDV FROM PO2: 37 %
SODIUM SERPL-SCNC: 136 MMOL/L (ref 133–144)
SOURCE: ABNORMAL
SP GR UR STRIP: 1.02 (ref 1–1.03)
SQUAMOUS #/AREA URNS AUTO: 1 /HPF (ref 0–1)
UROBILINOGEN UR STRIP-MCNC: NORMAL MG/DL (ref 0–2)
WBC # BLD AUTO: 7.3 10E9/L (ref 4–11)
WBC #/AREA URNS AUTO: <1 /HPF (ref 0–5)

## 2019-06-05 PROCEDURE — 82805 BLOOD GASES W/O2 SATURATION: CPT | Performed by: EMERGENCY MEDICINE

## 2019-06-05 PROCEDURE — 25000128 H RX IP 250 OP 636: Performed by: EMERGENCY MEDICINE

## 2019-06-05 PROCEDURE — 96361 HYDRATE IV INFUSION ADD-ON: CPT

## 2019-06-05 PROCEDURE — 83605 ASSAY OF LACTIC ACID: CPT

## 2019-06-05 PROCEDURE — 96374 THER/PROPH/DIAG INJ IV PUSH: CPT

## 2019-06-05 PROCEDURE — 82803 BLOOD GASES ANY COMBINATION: CPT

## 2019-06-05 PROCEDURE — 25800030 ZZH RX IP 258 OP 636: Performed by: EMERGENCY MEDICINE

## 2019-06-05 PROCEDURE — 82010 KETONE BODYS QUAN: CPT | Performed by: EMERGENCY MEDICINE

## 2019-06-05 PROCEDURE — 99284 EMERGENCY DEPT VISIT MOD MDM: CPT | Mod: 25

## 2019-06-05 PROCEDURE — 81001 URINALYSIS AUTO W/SCOPE: CPT | Performed by: EMERGENCY MEDICINE

## 2019-06-05 PROCEDURE — 85025 COMPLETE CBC W/AUTO DIFF WBC: CPT | Performed by: EMERGENCY MEDICINE

## 2019-06-05 PROCEDURE — 80048 BASIC METABOLIC PNL TOTAL CA: CPT | Performed by: EMERGENCY MEDICINE

## 2019-06-05 PROCEDURE — 81025 URINE PREGNANCY TEST: CPT | Performed by: EMERGENCY MEDICINE

## 2019-06-05 RX ORDER — ONDANSETRON 2 MG/ML
4 INJECTION INTRAMUSCULAR; INTRAVENOUS ONCE
Status: COMPLETED | OUTPATIENT
Start: 2019-06-05 | End: 2019-06-05

## 2019-06-05 RX ADMIN — ONDANSETRON 4 MG: 2 INJECTION INTRAMUSCULAR; INTRAVENOUS at 02:47

## 2019-06-05 RX ADMIN — SODIUM CHLORIDE, POTASSIUM CHLORIDE, SODIUM LACTATE AND CALCIUM CHLORIDE 1000 ML: 600; 310; 30; 20 INJECTION, SOLUTION INTRAVENOUS at 02:47

## 2019-06-05 ASSESSMENT — ENCOUNTER SYMPTOMS: NAUSEA: 1

## 2019-06-05 NOTE — ED PROVIDER NOTES
"  History     Chief Complaint:  Hyperglycemia     HPI   Gabby Isaacs is a 44 year old female with a history of type 1 diabetes who presents to the emergency department today for evaluation of hyperglycemia. The patient states she has an insulin pump and after starting a new infusion site she woke up at 10:30 PM and was told her sugar was high. She states she took a correction dose and the sugar was  Still high. She states she chanegd her infusion site and took another correction does without relief of her hyperglycemia. She states she checked for ketones and they were large. Here in the ED she endorses nausea, and denies feeling sick earlier in the day.     Allergies:  Dust Mites  Food  Melon  Mold  Pollen Extract     Medications:    Albuterol   Epipen  Glucagon   Levothyroxine  NNovolog    Past Medical History:    Cancer  Diabetes  Thyroid disease    Past Surgical History:    Appendectomy  Deviated septum  Thyroidectomy     Family History:    Diabetes, hypertension, arthritis, depression, hyperlipidemia, osteoporosis, mental illness, thyroid disease, cirrhosis, rhinitis, cardiomyopathy    Social History:  Smoking Status: Former Smoker   Types: Cigarettes  Smokeless Tobacco: Never Used  Alcohol Use: Positive    Drug use: Negative    Marital Status:  Single     Review of Systems   Constitutional: Negative for fever.   Respiratory: Negative for cough and shortness of breath.    Cardiovascular: Negative for chest pain.   Gastrointestinal: Positive for abdominal pain and nausea. Negative for vomiting.   Genitourinary: Negative for dysuria.   All other systems reviewed and are negative.      Physical Exam     Patient Vitals for the past 24 hrs:   BP Temp Temp src Heart Rate Resp SpO2 Height   06/05/19 0132 110/75 98.5  F (36.9  C) Oral 96 20 97 % 1.727 m (5' 8\")        Physical Exam  General: Appears well-developed and well-nourished.   Head: No signs of trauma.   CV: Normal rate and regular rhythm.    Resp: Effort " normal and breath sounds normal. No respiratory distress.   GI: Soft. There is no tenderness.  No rebound or guarding.  Normal bowel sounds.  No CVA tenderness.  MSK: Normal range of motion. no edema. No Calf tenderness.  Neuro: The patient is alert and oriented.  Speech normal.  GCS 15  Skin: Skin is warm and dry. No rash noted.   Psych: normal mood and affect. behavior is normal.       Emergency Department Course     Laboratory:  Laboratory findings were communicated with the patient who voiced understanding of the findings.    CBC: WBC 7.3, HGB 14.1,   BMP: Glucose 277 (H) o/w WNL (Creatinine 0.66)  ISTAT gases lactate higinio POCT: pH: 7.38, PCO2: 41, PO2: 22 (L), Bicarbonate: 24, O2 Sat: 37, Lactic acid: 1.2  Ketone Beta-hydroxybutyrate Quantitative: 0.2  Blood Gas Venous and oxyhgb: pH 7.39, PCO2 44, PO2 27, Bicarbonate 26, Oxyhgb 47     HCG Qualitative Urine: Negative  UA with micro: Glucose >1000, Ketones 80, Bacteria few o/w negative       Interventions:  0247 LR 1000 mL IV  0247 Zofran 4 mg IV    Emergency Department Course:    0227 Nursing notes and vitals reviewed.    0228 I performed an exam of the patient as documented above.     0249 The patient provided a urine sample here in the emergency department. This was sent for laboratory testing, findings above.     0231 IV was inserted and blood was drawn for laboratory testing, results above.     0350 I personally discussed the laboratory results with the patient and answered all related questions prior to discharge    Impression & Plan      Medical Decision Making:  Gabby Isaacs is a 44 year old female who presents to the emergency department today for evaluation of hyperglycemia and nausea.  She reports that she had changed her insulin pump infusion site this evening and had gone to bed.  Her glucose monitor alarmed and blood sugar was high and when it did not respond to a bolus she then changed her pump site and gave a subsequent bolus.  Given her  nausea and upset stomach feeling she also came the ER.  On my evaluation, patient said that she was feeling much better overall and her exam was benign.  I did obtain blood work which showed a moderately elevated blood sugar but no signs of DKA.  After receiving Zofran and fluids, the patient felt considerably better.  Her continuous glucose monitor showed that her glucose continued to drop to approximately 200.  Given there is no signs of DKA and insulin pump seems to be working appropriately at this time, I felt that she is appropriate for discharge home and outpatient management the patient felt very comfortable with this as well.  She is recommended to continue to push plenty of fluids and to follow-up with her endocrinologist for any continued concerns.    Diagnosis:    ICD-10-CM    1. Hyperglycemia R73.9      Disposition:   The patient is discharged to home.     Discharge Medications:  CONTINUE these medicines which have NOT CHANGED      Dose / Directions   blood glucose monitoring lancets  Used for:  Type 2 diabetes mellitus with hyperglycemia, without long-term current use of insulin (H)      Use to test blood sugars one times daily. Please fill One Touch lancets to fit meter.  Quantity:  1 Box  Refills:  1     blood glucose monitoring meter device kit  Commonly known as:  NO BRAND SPECIFIED  Used for:  Type 2 diabetes mellitus with hyperglycemia, without long-term current use of insulin (H)      Use to test blood sugar one times daily. Please dispense One Touch meter.  Quantity:  1 kit  Refills:  0     blood glucose test strip  Commonly known as:  ONETOUCH VERIO IQ  Used for:  Type 2 diabetes mellitus with hyperglycemia, without long-term current use of insulin (H)      Use to test blood sugars four times daily. Please dispense Verio Test Strips.  Quantity:  100 strip  Refills:  11           Scribe Disclosure:  I, Deidre Barfield, am serving as a scribe at 2:28 AM on 6/5/2019 to document services personally  performed by Giorgio Jeffery MD based on my observations and the provider's statements to me.         EMERGENCY DEPARTMENT       Giorgio Jeffery MD  06/11/19 0033

## 2019-06-05 NOTE — ED TRIAGE NOTES
Pt with Type 1 diabetes with an infusion pump. Changed her site tonight and pump woke her stating she had BGs in the 400s. Tested urine for ketones and shown large. Told by nurses line to come in. Feels nauseated. Pt did change infusion site and gave correction insulin. Numbers are coming down.

## 2019-06-11 ASSESSMENT — ENCOUNTER SYMPTOMS
COUGH: 0
VOMITING: 0
DYSURIA: 0
FEVER: 0
SHORTNESS OF BREATH: 0
ABDOMINAL PAIN: 1

## 2019-06-27 DIAGNOSIS — J45.40 MODERATE PERSISTENT EXTRINSIC ASTHMA WITHOUT COMPLICATION: ICD-10-CM

## 2019-06-27 NOTE — TELEPHONE ENCOUNTER
"Requested Prescriptions   Pending Prescriptions Disp Refills     beclomethasone HFA (QVAR REDIHALER) 40 MCG/ACT inhaler  Last Written Prescription Date:  6/8/2018  Last Fill Quantity: 3 Inhaler,  # refills: 3   Last office visit: 6/1/2018 with prescribing provider:  Eddie   Future Office Visit:     3 Inhaler 3     Sig: Inhale 2 puffs into the lungs 2 times daily       Inhaled Steroids Protocol Failed - 6/27/2019  2:36 PM        Failed - Asthma control assessment score within normal limits in last 6 months     Please review ACT score.   ACT Total Scores 6/1/2018   ACT TOTAL SCORE (Goal Greater than or Equal to 20) 23   In the past 12 months, how many times did you visit the emergency room for your asthma without being admitted to the hospital? 1   In the past 12 months, how many times were you hospitalized overnight because of your asthma? 0             Failed - Recent (6 mo) or future (30 days) visit within the authorizing provider's specialty     Patient had office visit in the last 6 months or has a visit in the next 30 days with authorizing provider or within the authorizing provider's specialty.  See \"Patient Info\" tab in inbasket, or \"Choose Columns\" in Meds & Orders section of the refill encounter.            Passed - Patient is age 12 or older        Passed - Medication is active on med list           "

## 2019-06-27 NOTE — TELEPHONE ENCOUNTER
Called patient regarding refill request.  Patient has established care with another provider.      Denied, patient to contact new PCP for refills.    ALMAZ Pinzon, RN  [facility]

## 2019-07-08 DIAGNOSIS — N94.6 DYSMENORRHEA: ICD-10-CM

## 2019-07-08 RX ORDER — NORETHINDRONE ACETATE/ETHINYL ESTRADIOL AND FERROUS FUMARATE 1.5-30(21)
KIT ORAL
Qty: 84 TABLET | Refills: 0 | OUTPATIENT
Start: 2019-07-08

## 2019-07-08 NOTE — TELEPHONE ENCOUNTER
Per chart review, patient has established care with another provider. Left VM informing patient of need for office visit. Sent [employer] message to clarify patient has established care with a new provider. Rx denied.

## 2019-07-08 NOTE — TELEPHONE ENCOUNTER
"Requested Prescriptions   Pending Prescriptions Disp Refills     MANPREET FE 1.5/30 1.5-30 MG-MCG tablet [Pharmacy Med Name: norethindrone 1.5 mg-ethinyl estradiol 30 mcg(21)/iron 75 mg(7) tablet]  Last Written Prescription Date:  4/4/2019  Last Fill Quantity: 90 tablet,  # refills: 0   Last office visit: 6/1/2018 with prescribing provider:  Eddie   Future Office Visit:     84 tablet 0     Sig: Take 1 tablet by mouth daily       Contraceptives Protocol Failed - 7/8/2019 12:08 AM        Failed - Recent (12 mo) or future (30 days) visit within the authorizing provider's specialty     Patient had office visit in the last 12 months or has a visit in the next 30 days with authorizing provider or within the authorizing provider's specialty.  See \"Patient Info\" tab in inbasket, or \"Choose Columns\" in Meds & Orders section of the refill encounter.              Passed - Patient is not a current smoker if age is 35 or older        Passed - Medication is active on med list        Passed - No active pregnancy on record        Passed - No positive pregnancy test in past 12 months           "

## 2019-10-19 DIAGNOSIS — N94.6 DYSMENORRHEA: ICD-10-CM

## 2019-10-19 NOTE — TELEPHONE ENCOUNTER
"Requested Prescriptions   Pending Prescriptions Disp Refills     JUNEL FE 1.5/30 1.5-30 MG-MCG tablet [Pharmacy Med Name: JUNEL FE 1.5 MG-30 MCG TABLET]    Last Written Prescription Date:  04/04/2019  Last Fill Quantity: 90 tablet,  # refills: 0   Last office visit: 6/1/2018 with prescribing provider:  Eddie   Future Office Visit:     84 tablet 3     Sig: TAKE 1 TABLET BY MOUTH EVERY DAY       Contraceptives Protocol Failed - 10/19/2019 10:37 AM        Failed - Recent (12 mo) or future (30 days) visit within the authorizing provider's specialty     Patient has had an office visit with the authorizing provider or a provider within the authorizing providers department within the previous 12 mos or has a future within next 30 days. See \"Patient Info\" tab in inbasket, or \"Choose Columns\" in Meds & Orders section of the refill encounter.              Passed - Patient is not a current smoker if age is 35 or older        Passed - Medication is active on med list        Passed - No active pregnancy on record        Passed - No positive pregnancy test in past 12 months           "

## 2019-10-22 RX ORDER — NORETHINDRONE ACETATE AND ETHINYL ESTRADIOL AND FERROUS FUMARATE 1.5-30(21)
KIT ORAL
Qty: 28 TABLET | Refills: 0 | Status: SHIPPED | OUTPATIENT
Start: 2019-10-22

## 2020-02-10 ENCOUNTER — HEALTH MAINTENANCE LETTER (OUTPATIENT)
Age: 45
End: 2020-02-10

## 2020-09-11 NOTE — ED AVS SNAPSHOT
Emergency Department  [address] [place] [id number]  Phone:  [phone]  Fax:  [phone]                                    Gabby Isaacs   MRN: [id number]    Department:   Emergency Department   Date of Visit:  6/5/2019           After Visit Summary Signature Page    I have received my discharge instructions, and my questions have been answered. I have discussed any challenges I see with this plan with the nurse or doctor.    ..........................................................................................................................................  Patient/Patient Representative Signature      ..........................................................................................................................................  Patient Representative Print Name and Relationship to Patient    ..................................................               ................................................  Date                                   Time    ..........................................................................................................................................  Reviewed by Signature/Title    ...................................................              ..............................................  Date                                               Time          22EPIC Rev 08/18        Procedure(s):  COLONOSCOPY  ENDOSCOPIC POLYPECTOMY  COLON BIOPSY  INJECTION w/ black eye ink. MAC    Anesthesia Post Evaluation      Multimodal analgesia: multimodal analgesia used between 6 hours prior to anesthesia start to PACU discharge  Patient location during evaluation: PACU  Patient participation: complete - patient participated  Level of consciousness: awake and alert  Pain management: adequate  Airway patency: patent  Anesthetic complications: no  Cardiovascular status: acceptable  Respiratory status: acceptable  Hydration status: acceptable  Post anesthesia nausea and vomiting:  none      INITIAL Post-op Vital signs:   Vitals Value Taken Time   BP     Temp     Pulse 60 9/11/2020 10:28 AM   Resp 12 9/11/2020 10:28 AM   SpO2 100 % 9/11/2020 10:28 AM   Vitals shown include unvalidated device data.

## 2020-11-16 ENCOUNTER — HEALTH MAINTENANCE LETTER (OUTPATIENT)
Age: 45
End: 2020-11-16

## 2021-04-03 ENCOUNTER — HEALTH MAINTENANCE LETTER (OUTPATIENT)
Age: 46
End: 2021-04-03

## 2021-05-29 ENCOUNTER — HEALTH MAINTENANCE LETTER (OUTPATIENT)
Age: 46
End: 2021-05-29

## 2021-09-18 ENCOUNTER — HEALTH MAINTENANCE LETTER (OUTPATIENT)
Age: 46
End: 2021-09-18

## 2021-10-29 ENCOUNTER — TRANSFERRED RECORDS (OUTPATIENT)
Dept: HEALTH INFORMATION MANAGEMENT | Facility: CLINIC | Age: 46
End: 2021-10-29
Payer: COMMERCIAL

## 2021-12-10 ENCOUNTER — MEDICAL CORRESPONDENCE (OUTPATIENT)
Dept: HEALTH INFORMATION MANAGEMENT | Facility: CLINIC | Age: 46
End: 2021-12-10
Payer: COMMERCIAL

## 2021-12-10 ENCOUNTER — TRANSCRIBE ORDERS (OUTPATIENT)
Dept: OTHER | Age: 46
End: 2021-12-10
Payer: COMMERCIAL

## 2021-12-10 DIAGNOSIS — J32.2 CHRONIC ETHMOIDAL SINUSITIS: Primary | ICD-10-CM

## 2021-12-14 NOTE — TELEPHONE ENCOUNTER
FUTURE VISIT INFORMATION      FUTURE VISIT INFORMATION:    Date: 2/4/2022    Time: 10:30AM    [place] [facility] [place]:    Referring provider:  Chilo Hurd MD    Referring providers clinic:  [facility]     Reason for visit/diagnosis  Chronic ethmoidal sinusitis, recs to be in [facility], had CT at [facility], ref by Ten Hurd, appt made by pt    RECORDS REQUESTED FROM:       Clinic name Comments Records Status Imaging Status   [facility]  12/10/2021 note from Chilo Hurd MD Care everywhere    All[facility]   9/13/2021 note from Lupillo Montoya MD  Care everywhere    [facility] [address] Allergy   8/30/2017 note from Ariana Younger MD   Care everywhere     [facility] [address] Ear, Nose, and Throat 8/7/2017 note from Jefry Feliciano MD Care everywhere     [facility] imaging 10/29/2021 CT Head  Care everywhere  req 12/14/21 - PACS   [facility]  4/25/2018 ENT note from Dr Chase Swartz  2/14/2018, 12/6/2017 ENT note from María Helms NP  2/6/2018 op report       Images   1/15/2018 CT Maxillofacial   7/14/2017 CT Sinus  req 12/14/21 - received 12/27/21 req 12/14/21 - PACS                 12/14/21 11:44AM sent a fx to [facility] for images and [facility] for recs and images - [facility]   12/20/2021 2:48PM images from [facility] received, waiting for [facility] images and recs - [facility]   12/27/21 9:31AM images and recs received from [facility], sent to [facility] - [facility]

## 2022-01-08 ENCOUNTER — HEALTH MAINTENANCE LETTER (OUTPATIENT)
Age: 47
End: 2022-01-08

## 2022-02-04 ENCOUNTER — OFFICE VISIT (OUTPATIENT)
Dept: OTOLARYNGOLOGY | Facility: CLINIC | Age: 47
End: 2022-02-04
Payer: COMMERCIAL

## 2022-02-04 ENCOUNTER — PRE VISIT (OUTPATIENT)
Dept: OTOLARYNGOLOGY | Facility: CLINIC | Age: 47
End: 2022-02-04

## 2022-02-04 VITALS
HEART RATE: 90 BPM | TEMPERATURE: 97.5 F | OXYGEN SATURATION: 97 % | HEIGHT: 68 IN | BODY MASS INDEX: 26.61 KG/M2 | SYSTOLIC BLOOD PRESSURE: 130 MMHG | DIASTOLIC BLOOD PRESSURE: 78 MMHG

## 2022-02-04 DIAGNOSIS — R09.81 NASAL CONGESTION: ICD-10-CM

## 2022-02-04 DIAGNOSIS — R09.82 POST-NASAL DRIP: ICD-10-CM

## 2022-02-04 DIAGNOSIS — J32.0 CHRONIC MAXILLARY SINUSITIS: Primary | ICD-10-CM

## 2022-02-04 DIAGNOSIS — J32.2 CHRONIC ETHMOIDAL SINUSITIS: ICD-10-CM

## 2022-02-04 PROCEDURE — 99204 OFFICE O/P NEW MOD 45 MIN: CPT | Mod: 25 | Performed by: OTOLARYNGOLOGY

## 2022-02-04 PROCEDURE — 31231 NASAL ENDOSCOPY DX: CPT | Performed by: OTOLARYNGOLOGY

## 2022-02-04 RX ORDER — ATORVASTATIN CALCIUM 10 MG/1
10 TABLET, FILM COATED ORAL
COMMUNITY
Start: 2021-08-22

## 2022-02-04 RX ORDER — FLUTICASONE PROPIONATE 220 UG/1
AEROSOL, METERED RESPIRATORY (INHALATION)
COMMUNITY
Start: 2022-01-13

## 2022-02-04 RX ORDER — BECLOMETHASONE DIPROPIONATE 80 UG/1
2 AEROSOL, METERED NASAL
COMMUNITY
Start: 2015-10-19

## 2022-02-04 RX ORDER — MUPIROCIN 20 MG/G
OINTMENT TOPICAL
COMMUNITY
Start: 2021-12-10

## 2022-02-04 RX ORDER — PROCHLORPERAZINE 25 MG/1
SUPPOSITORY RECTAL
COMMUNITY
Start: 2021-12-21

## 2022-02-04 RX ORDER — AZELASTINE 1 MG/ML
SPRAY, METERED NASAL
COMMUNITY
Start: 2015-04-20

## 2022-02-04 RX ORDER — BUDESONIDE 0.5 MG/2ML
INHALANT ORAL
COMMUNITY
Start: 2018-05-25

## 2022-02-04 RX ORDER — FEXOFENADINE HCL 180 MG/1
180 TABLET ORAL
COMMUNITY
Start: 2010-08-31

## 2022-02-04 RX ORDER — DEXTROAMPHETAMINE SACCHARATE, AMPHETAMINE ASPARTATE MONOHYDRATE, DEXTROAMPHETAMINE SULFATE AND AMPHETAMINE SULFATE 2.5; 2.5; 2.5; 2.5 MG/1; MG/1; MG/1; MG/1
CAPSULE, EXTENDED RELEASE ORAL
COMMUNITY
Start: 2021-10-01

## 2022-02-04 ASSESSMENT — PAIN SCALES - GENERAL: PAINLEVEL: NO PAIN (0)

## 2022-02-04 NOTE — PROGRESS NOTES
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    History of Present Illness:   Gabby Isaacs is a 47 year old female who presents for consultation regarding chronic ethmoidal sinusitis. The patient has history of sinus surgery at [facility] approximately 6 years ago. She reports that her sinusitis symptoms initially felt improved, but have been gradually becoming worse over time. She visited with Dr. Mnotoya of Allergy in [facility] in September 2021 and was treated with Augmentin for acute sinusitis. Gabby explained to Dr. Montoya that she has been having increased facial pressure, nasal drainage and an odor to the nasal drainage. The patient underwent imaging and referred her here for further evaluation.     Today, the patient reports that she had 1.5 years of significant relief from her congestion, but then returned to the same severity as prior to surgery. She has been using mupirocin irrigations, and this has significantly improved her symptoms. Gabby explains that her process of having sinus infection shows green and yellow drainage when she does her nasal rinses, fatigue, headache, and chills. She also endorses severe congestion for over half of each year.     [facility]:  Sinus surgery 2018    Review of systems: A 14-point review of systems has been conducted and was negative for any notable symptoms, except as dictated in the history of present illness.     Medical History:  Past Medical History:   Diagnosis Date     Cancer (H)     thyroid cancer     Diabetes (H)      Thyroid disease       Surgical History:   Past Surgical History:   Procedure Laterality Date     APPENDECTOMY  1995     deviated septum  2009     THYROIDECTOMY  03/2009      Family History:  Family  History   Problem Relation Age of Onset     Diabetes Mother      Hypertension Mother      Rhinitis Mother      Arthritis Mother      Depression Mother      Hyperlipidemia Mother      Osteoporosis Mother      Mental Illness Mother      Thyroid Disease Mother      Mental Illness Father      Cirrhosis Father      Rhinitis Father      Depression Father      Cerebrovascular Disease Maternal Grandmother         Stroke     Arthritis Maternal Grandmother      Depression Maternal Grandmother      Mental Illness Maternal Grandmother      Coronary Artery Disease Maternal Grandfather      Cardiomyopathy Maternal Grandfather         heart problems     Depression Paternal Grandmother      Mental Illness Paternal Grandmother      Mental Illness Paternal Grandfather      Cardiomyopathy Paternal Grandfather         heart problems     Depression Paternal Grandfather      Rhinitis Sister      Depression Sister      Cirrhosis Brother      Rhinitis Brother      Mental Illness Sister      Mental Illness Brother       Social History:   Social History     Socioeconomic History     Marital status: Single     Spouse name: Not on file     Number of children: Not on file     Years of education: Not on file     Highest education level: Not on file   Occupational History     Not on file   Tobacco Use     Smoking status: Former Smoker     Types: Cigarettes     Smokeless tobacco: Never Used   Substance and Sexual Activity     Alcohol use: Yes     Alcohol/week: 0.0 standard drinks     Comment: 3 drinks amonth     Drug use: No     Sexual activity: Not Currently     Partners: Female   Other Topics Concern     Parent/sibling w/ CABG, MI or angioplasty before 65F 55M? Not Asked   Social History Narrative     Not on file     Social Determinants of Health     Financial Resource Strain: Not on file   Food Insecurity: Not on file   Transportation Needs: Not on file   Physical Activity: Not on file   Stress: Not on file   Social Connections: Not on file  "  Intimate Partner Violence: Not on file   Housing Stability: Not on file      Physical Exam:  Vital signs: /78 (BP Location: Left arm, Patient Position: Sitting, Cuff Size: Adult Regular)   Pulse 90   Temp 97.5  F (36.4  C) (Temporal)   Ht 1.727 m (5' 8\")   SpO2 97%   BMI 26.61 kg/m     General Appearance: No acute distress, appropriate demeanor, conversant  Eyes: moist conjunctivae; EOMI; pupils symmetric; visual acuity grossly intact; no proptosis  Head: normocephalic; overall symmetric appearance without deformity  Face: overall symmetric without deformity; HB I/VI  Ears: Normal appearance of external ear; external meatus normal in appearance; TMs intact without perforation bilaterally   Nose: No external deformity; see endoscopy exam below  Oral Cavity/oropharynx: Normal appearance of mucosa; tongue midline; no mass or lesions; purulence in the oropharynx  Neck: no palpable lymphadenopathy; thyroid surgically absent - surgical scar is clean, dry, and intact  Lungs: symmetric chest rise; no wheezing  CV: Good distal perfusion; normal hear rate  Extremities: No deformity  Neurologic Exam: Cranial nerves II-XII are grossly intact; no focal deficit    Procedure Note  Procedure performed: Rigid nasal endoscopy  Indication: To evaluate for sinonasal pathology not visualized on routine anterior rhinoscopy  Anesthesia: 4% topical lidocaine with 0.05% oxymetazoline  Description of procedure: A 30 degree, 3 mm rigid endoscope was inserted into bilateral nasal cavities and the nasal valves, nasal cavity, middle meatus, sphenoethmoid recess, and nasopharynx were thoroughly evaluated for evidence of obstruction, edema, purulence, polyps and/or mass/lesion.     Jeffery-Arya Endoscopic Scoring System  Endoscopic observation Right Left   Polyps in middle meatus (0 = absent, 1 = restricted to middle meatus, 2 = Beyond middle meatus) 0 0   Discharge (0 = absent, 1 = thin and clear, 2 = thick, purulent) 0 1   Edema (0 " = absent, 1 = mild-moderate, 2 = moderate-severe) 1 1   Crusting (0 = absent, 1 = mild-moderate, 2 = moderate-severe) 0 0   Scarring (0= absent, 1 = mild-moderate, 2 = moderate-severe) 0 0   Total 1 2     Findings  RT: mild edema of the MM; all sinuses patent otherwise, sinuses are clear  LT: mild edema of the MM around the frontal recess, maxillary sinus, ethmoid sinus, and sphenoid sinus clear    The patient tolerated the procedure well without complication.     Laboratory Review:  n/a    Imaging Review:  CT Head [facility] [place] WO 9/27/21  IMPRESSION:  1. Postoperative changes of endoscopic sinus surgery  2. Chronic paranasal sinusitis most pronounced within the right   ethmoid and maxillary sinuses.  3. No air fluid levels or other findings to suggest acute sinusitis    *I have personally reviewed these images and agree with the radiologist's impression*     Pathology Review:  n/a    Assessment/Medical Decision Making:  CRSsNP, biofilm-related?  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 I assured he she will be in good hands with Dr. Vickey Carolina MD    [facility]  Rhinology  Endoscopic Skull Base Surgery  [facility]  Department of Otolaryngology - Head & Neck Surgery      Scribe Disclosure:  I, Gabby Quinonez, am serving as a scribe to document services personally performed by Darwin Carolina MD at this visit, based upon the provider's statements to me. All documentation has been reviewed by the aforementioned provider prior to being entered into the official medical record.

## 2022-02-04 NOTE — LETTER
2/4/2022       RE: Gabby Isaacs  [address] [id number]     Dear Colleague,    Thank you for referring your patient, Gabby Isaacs, to the [facility] [place] at [facility]. Please see a copy of my visit note below.      [facility]  New Patient Visit      Encounter date:   February 4, 2022    Referring Provider:   Chilo Hurd  ENT [facility]     Chief Complaint: chronic ethmoidal sinusitis    ID: CRSwNP s/p two prior sinus surgeries at [facility].    History of Present Illness:   Gabby Isaacs is a 47 year old female who presents for consultation regarding chronic ethmoidal sinusitis. The patient has history of sinus surgery at [facility] approximately 6 years ago. She reports that her sinusitis symptoms initially felt improved, but have been gradually becoming worse over time. She visited with Dr. Montoya of Allergy in [facility] in September 2021 and was treated with Augmentin for acute sinusitis. Gabby explained to Dr. Montoya that she has been having increased facial pressure, nasal drainage and an odor to the nasal drainage. The patient underwent imaging and referred her here for further evaluation.     Today, the patient reports that she had 1.5 years of significant relief from her congestion, but then returned to the same severity as prior to surgery. She has been using mupirocin irrigations, and this has significantly improved her symptoms. Gabby explains that her process of having sinus infection shows green and yellow drainage when she does her nasal rinses, fatigue, headache, and chills. She also endorses severe congestion for over half of each year.     [place] Operative History:  Sinus surgery 2018    Review of systems: A 14-point review of systems has been conducted and was negative for any notable symptoms, except as dictated in the history of present  illness.     Medical History:  Past Medical History:   Diagnosis Date     Cancer (H)     thyroid cancer     Diabetes (H)      Thyroid disease       Surgical History:   Past Surgical History:   Procedure Laterality Date     APPENDECTOMY  1995     deviated septum  2009     THYROIDECTOMY  03/2009      Family History:  Family History   Problem Relation Age of Onset     Diabetes Mother      Hypertension Mother      Rhinitis Mother      Arthritis Mother      Depression Mother      Hyperlipidemia Mother      Osteoporosis Mother      Mental Illness Mother      Thyroid Disease Mother      Mental Illness Father      Cirrhosis Father      Rhinitis Father      Depression Father      Cerebrovascular Disease Maternal Grandmother         Stroke     Arthritis Maternal Grandmother      Depression Maternal Grandmother      Mental Illness Maternal Grandmother      Coronary Artery Disease Maternal Grandfather      Cardiomyopathy Maternal Grandfather         heart problems     Depression Paternal Grandmother      Mental Illness Paternal Grandmother      Mental Illness Paternal Grandfather      Cardiomyopathy Paternal Grandfather         heart problems     Depression Paternal Grandfather      Rhinitis Sister      Depression Sister      Cirrhosis Brother      Rhinitis Brother      Mental Illness Sister      Mental Illness Brother       Social History:   Social History     Socioeconomic History     Marital status: Single     Spouse name: Not on file     Number of children: Not on file     Years of education: Not on file     Highest education level: Not on file   Occupational History     Not on file   Tobacco Use     Smoking status: Former Smoker     Types: Cigarettes     Smokeless tobacco: Never Used   Substance and Sexual Activity     Alcohol use: Yes     Alcohol/week: 0.0 standard drinks     Comment: 3 drinks amonth     Drug use: No     Sexual activity: Not Currently     Partners: Female   Other Topics Concern     Parent/sibling w/ CABG,  "MI or angioplasty before 65F 55M? Not Asked   Social History Narrative     Not on file     Social Determinants of Health     Financial Resource Strain: Not on file   Food Insecurity: Not on file   Transportation Needs: Not on file   Physical Activity: Not on file   Stress: Not on file   Social Connections: Not on file   Intimate Partner Violence: Not on file   Housing Stability: Not on file      Physical Exam:  Vital signs: /78 (BP Location: Left arm, Patient Position: Sitting, Cuff Size: Adult Regular)   Pulse 90   Temp 97.5  F (36.4  C) (Temporal)   Ht 1.727 m (5' 8\")   SpO2 97%   BMI 26.61 kg/m     General Appearance: No acute distress, appropriate demeanor, conversant  Eyes: moist conjunctivae; EOMI; pupils symmetric; visual acuity grossly intact; no proptosis  Head: normocephalic; overall symmetric appearance without deformity  Face: overall symmetric without deformity; HB I/VI  Ears: Normal appearance of external ear; external meatus normal in appearance; TMs intact without perforation bilaterally   Nose: No external deformity; see endoscopy exam below  Oral Cavity/oropharynx: Normal appearance of mucosa; tongue midline; no mass or lesions; purulence in the oropharynx  Neck: no palpable lymphadenopathy; thyroid surgically absent - surgical scar is clean, dry, and intact  Lungs: symmetric chest rise; no wheezing  CV: Good distal perfusion; normal hear rate  Extremities: No deformity  Neurologic Exam: Cranial nerves II-XII are grossly intact; no focal deficit    Procedure Note  Procedure performed: Rigid nasal endoscopy  Indication: To evaluate for sinonasal pathology not visualized on routine anterior rhinoscopy  Anesthesia: 4% topical lidocaine with 0.05% oxymetazoline  Description of procedure: A 30 degree, 3 mm rigid endoscope was inserted into bilateral nasal cavities and the nasal valves, nasal cavity, middle meatus, sphenoethmoid recess, and nasopharynx were thoroughly evaluated for evidence of " obstruction, edema, purulence, polyps and/or mass/lesion.     Eva-Arya Endoscopic Scoring System  Endoscopic observation Right Left   Polyps in middle meatus (0 = absent, 1 = restricted to middle meatus, 2 = Beyond middle meatus) 0 0   Discharge (0 = absent, 1 = thin and clear, 2 = thick, purulent) 0 1   Edema (0 = absent, 1 = mild-moderate, 2 = moderate-severe) 1 1   Crusting (0 = absent, 1 = mild-moderate, 2 = moderate-severe) 0 0   Scarring (0= absent, 1 = mild-moderate, 2 = moderate-severe) 0 0   Total 1 2     Findings  RT: mild edema of the MM; all sinuses patent otherwise, sinuses are clear  LT: mild edema of the MM around the frontal recess, maxillary sinus, ethmoid sinus, and sphenoid sinus clear    The patient tolerated the procedure well without complication.     Laboratory Review:  n/a    Imaging Review:  CT Head [facility] [place] WO 9/27/21  IMPRESSION:  1. Postoperative changes of endoscopic sinus surgery  2. Chronic paranasal sinusitis most pronounced within the right   ethmoid and maxillary sinuses.  3. No air fluid levels or other findings to suggest acute sinusitis    *I have personally reviewed these images and agree with the radiologist's impression*     Pathology Review:  n/a    Assessment/Medical Decision Making:  CRSsNP, biofilm-related?  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 I assured he she will be in good hands with Dr. Vickey Carolina MD    [facility]  Rhinology  Endoscopic Skull Base Surgery  [facility]  Department of Otolaryngology - Head & Neck Surgery      Scribe Disclosure:  I, Gabby Quinonez, am serving as a scribe to document  services personally performed by Darwin Carolina MD at this visit, based upon the provider's statements to me. All documentation has been reviewed by the aforementioned provider prior to being entered into the official medical record.       Again, thank you for allowing me to participate in the care of your patient.      Sincerely,    Darwin Carolina MD

## 2022-02-04 NOTE — PATIENT INSTRUCTIONS
"1. You were seen in the clinic today by Dr. Carolina. If you have any questions or concerns after your appointment, please call the clinic at [phone]. Press \"1\" for scheduling, press \"3\" for nurse advice.    2.   The following has been recommended for you based upon your appointment today:   - Continue Mupirocin irrigations for 1 month.    3.   Follow up with [facility].        Gabby Bennett LPN  [facility]  Department of Otolaryngology  [phone]    "

## 2022-02-04 NOTE — NURSING NOTE
"Chief Complaint   Patient presents with     Consult     chronic ethmoidal sinusitis    Blood pressure 130/78, pulse 90, temperature 97.5  F (36.4  C), temperature source Temporal, height 1.727 m (5' 8\"), SpO2 97 %, not currently breastfeeding. Deidre Oreilly, EMT  "

## 2022-04-30 ENCOUNTER — HEALTH MAINTENANCE LETTER (OUTPATIENT)
Age: 47
End: 2022-04-30

## 2022-06-25 ENCOUNTER — HEALTH MAINTENANCE LETTER (OUTPATIENT)
Age: 47
End: 2022-06-25

## 2022-11-19 ENCOUNTER — HEALTH MAINTENANCE LETTER (OUTPATIENT)
Age: 47
End: 2022-11-19

## 2023-04-09 ENCOUNTER — HEALTH MAINTENANCE LETTER (OUTPATIENT)
Age: 48
End: 2023-04-09

## 2023-06-01 ENCOUNTER — HEALTH MAINTENANCE LETTER (OUTPATIENT)
Age: 48
End: 2023-06-01

## 2023-11-19 ENCOUNTER — HEALTH MAINTENANCE LETTER (OUTPATIENT)
Age: 48
End: 2023-11-19

## 2024-06-16 ENCOUNTER — HEALTH MAINTENANCE LETTER (OUTPATIENT)
Age: 49
End: 2024-06-16

## 2024-12-29 ENCOUNTER — HEALTH MAINTENANCE LETTER (OUTPATIENT)
Age: 49
End: 2024-12-29